# Patient Record
Sex: FEMALE | Race: WHITE | NOT HISPANIC OR LATINO | Employment: PART TIME | URBAN - METROPOLITAN AREA
[De-identification: names, ages, dates, MRNs, and addresses within clinical notes are randomized per-mention and may not be internally consistent; named-entity substitution may affect disease eponyms.]

---

## 2017-12-13 ENCOUNTER — OFFICE VISIT (OUTPATIENT)
Dept: INTERNAL MEDICINE | Facility: CLINIC | Age: 32
End: 2017-12-13

## 2017-12-13 VITALS
WEIGHT: 163 LBS | OXYGEN SATURATION: 99 % | DIASTOLIC BLOOD PRESSURE: 68 MMHG | HEART RATE: 75 BPM | SYSTOLIC BLOOD PRESSURE: 116 MMHG | BODY MASS INDEX: 24.71 KG/M2 | HEIGHT: 68 IN

## 2017-12-13 DIAGNOSIS — Z83.3 FAMILY HISTORY OF DIABETES MELLITUS IN FIRST DEGREE RELATIVE: Primary | ICD-10-CM

## 2017-12-13 DIAGNOSIS — E03.9 ACQUIRED HYPOTHYROIDISM: ICD-10-CM

## 2017-12-13 DIAGNOSIS — F32.A DEPRESSION, UNSPECIFIED DEPRESSION TYPE: ICD-10-CM

## 2017-12-13 DIAGNOSIS — Z86.2 HISTORY OF ANEMIA: ICD-10-CM

## 2017-12-13 LAB
ALBUMIN SERPL-MCNC: 4.7 G/DL (ref 3.2–4.8)
ALBUMIN/GLOB SERPL: 1.9 G/DL (ref 1.5–2.5)
ALP SERPL-CCNC: 49 U/L (ref 25–100)
ALT SERPL W P-5'-P-CCNC: 23 U/L (ref 7–40)
ANION GAP SERPL CALCULATED.3IONS-SCNC: 11 MMOL/L (ref 3–11)
AST SERPL-CCNC: 18 U/L (ref 0–33)
BASOPHILS # BLD AUTO: 0.02 10*3/MM3 (ref 0–0.2)
BASOPHILS NFR BLD AUTO: 0.3 % (ref 0–1)
BILIRUB SERPL-MCNC: 0.9 MG/DL (ref 0.3–1.2)
BUN BLD-MCNC: 8 MG/DL (ref 9–23)
BUN/CREAT SERPL: 11.4 (ref 7–25)
CALCIUM SPEC-SCNC: 9.4 MG/DL (ref 8.7–10.4)
CHLORIDE SERPL-SCNC: 100 MMOL/L (ref 99–109)
CO2 SERPL-SCNC: 26 MMOL/L (ref 20–31)
CREAT BLD-MCNC: 0.7 MG/DL (ref 0.6–1.3)
DEPRECATED RDW RBC AUTO: 40 FL (ref 37–54)
EOSINOPHIL # BLD AUTO: 0.09 10*3/MM3 (ref 0–0.3)
EOSINOPHIL NFR BLD AUTO: 1.2 % (ref 0–3)
ERYTHROCYTE [DISTWIDTH] IN BLOOD BY AUTOMATED COUNT: 11.9 % (ref 11.3–14.5)
GFR SERPL CREATININE-BSD FRML MDRD: 97 ML/MIN/1.73
GLOBULIN UR ELPH-MCNC: 2.5 GM/DL
GLUCOSE BLD-MCNC: 74 MG/DL (ref 70–100)
HBA1C MFR BLD: 5.1 % (ref 4.8–5.6)
HCT VFR BLD AUTO: 39.3 % (ref 34.5–44)
HGB BLD-MCNC: 13.3 G/DL (ref 11.5–15.5)
IMM GRANULOCYTES # BLD: 0.02 10*3/MM3 (ref 0–0.03)
IMM GRANULOCYTES NFR BLD: 0.3 % (ref 0–0.6)
LYMPHOCYTES # BLD AUTO: 1.81 10*3/MM3 (ref 0.6–4.8)
LYMPHOCYTES NFR BLD AUTO: 24.6 % (ref 24–44)
MCH RBC QN AUTO: 30.6 PG (ref 27–31)
MCHC RBC AUTO-ENTMCNC: 33.8 G/DL (ref 32–36)
MCV RBC AUTO: 90.6 FL (ref 80–99)
MONOCYTES # BLD AUTO: 0.42 10*3/MM3 (ref 0–1)
MONOCYTES NFR BLD AUTO: 5.7 % (ref 0–12)
NEUTROPHILS # BLD AUTO: 5 10*3/MM3 (ref 1.5–8.3)
NEUTROPHILS NFR BLD AUTO: 67.9 % (ref 41–71)
PLATELET # BLD AUTO: 271 10*3/MM3 (ref 150–450)
PMV BLD AUTO: 10.9 FL (ref 6–12)
POTASSIUM BLD-SCNC: 4.1 MMOL/L (ref 3.5–5.5)
PROT SERPL-MCNC: 7.2 G/DL (ref 5.7–8.2)
RBC # BLD AUTO: 4.34 10*6/MM3 (ref 3.89–5.14)
SODIUM BLD-SCNC: 137 MMOL/L (ref 132–146)
T4 FREE SERPL-MCNC: 1.04 NG/DL (ref 0.89–1.76)
TSH SERPL DL<=0.05 MIU/L-ACNC: 2.36 MIU/ML (ref 0.35–5.35)
WBC NRBC COR # BLD: 7.36 10*3/MM3 (ref 3.5–10.8)

## 2017-12-13 PROCEDURE — 83036 HEMOGLOBIN GLYCOSYLATED A1C: CPT | Performed by: NURSE PRACTITIONER

## 2017-12-13 PROCEDURE — 80053 COMPREHEN METABOLIC PANEL: CPT | Performed by: NURSE PRACTITIONER

## 2017-12-13 PROCEDURE — 84443 ASSAY THYROID STIM HORMONE: CPT | Performed by: NURSE PRACTITIONER

## 2017-12-13 PROCEDURE — 84439 ASSAY OF FREE THYROXINE: CPT | Performed by: NURSE PRACTITIONER

## 2017-12-13 PROCEDURE — 99203 OFFICE O/P NEW LOW 30 MIN: CPT | Performed by: NURSE PRACTITIONER

## 2017-12-13 PROCEDURE — 85025 COMPLETE CBC W/AUTO DIFF WBC: CPT | Performed by: NURSE PRACTITIONER

## 2017-12-13 NOTE — PROGRESS NOTES
No chief complaint on file.      HPI  Estefany Jesus is a 32 y.o. female  presents with complaint of 1 day history of sudden onset headache, fatigue, body aches, sore throat, ear pain, head congestion, sneezing, denies fever, cough, wheezing, shortness of breath, n/v/d    Has not taken anything other than       \No past medical history on file.    No family history on file.    Social History     Social History   • Marital status: Unknown     Spouse name: N/A   • Number of children: N/A   • Years of education: N/A     Occupational History   • Not on file.     Social History Main Topics   • Smoking status: Not on file   • Smokeless tobacco: Not on file   • Alcohol use Not on file   • Drug use: Not on file   • Sexual activity: Not on file     Other Topics Concern   • Not on file     Social History Narrative       Review of Systems    There were no vitals taken for this visit.    Physical Exam    Assessment/Plan  No results found for this or any previous visit.    There are no diagnoses linked to this encounter.        Patient verbalizes understanding of medication dosage, comfort measures, instructions for treatment and follow-up.    ANGELINA Mccarty  [unfilled]  5:28 PM

## 2017-12-13 NOTE — PROGRESS NOTES
Chief Complaint  Chief Complaint   Patient presents with   • Establish Care     needs new PCP. needs a physical. pt wants to get labs for thyroid.   • Fatigue   • Muscle Pain   • Hot Flashes       Estefany Jesus is a 32 y.o. female presents for hypothyroidism and is here to establish care.     HPI   She was diagnosed with hypothyroidism 4-5 yrs ago, she was on levothyroxine 100-150 mcg for approximately 1 year, but then lost her insurance, and stopped taking it when she ran out.  She has been unable to have her medication in 4 years.      She presents today with fatigue that has progressively worsened over the past few months; muscle tightness and pain; increased thirst; chills with face flushing; she denies rash on face or body, joint swelling.    She also has a history of depression which she states is no a chronic problem and she can usually manage this without medication.    She has not had a pap smear in 5-6 years and reports never having an abnormal pap.    Family history includes: Mother--pancreatic cancer; MGF--liver cancer; father--has melanoma with metastases to lungs/brain; PGM--cancer, unsure where originated.    Past Medical History:   Diagnosis Date   • Depression    • Hypothyroid    • Pancreatitis        History reviewed. No pertinent surgical history.    Family History   Problem Relation Age of Onset   • Diabetes Mother    • Hyperlipidemia Mother    • Diabetes Father    • Hyperlipidemia Father    • Hypertension Father    • COPD Maternal Grandmother    • COPD Maternal Grandfather    • COPD Paternal Grandmother    • COPD Paternal Grandfather        Social History     Social History   • Marital status: Unknown     Spouse name: N/A   • Number of children: N/A   • Years of education: N/A     Occupational History   • Not on file.     Social History Main Topics   • Smoking status: Current Every Day Smoker     Types: Electronic Cigarette   • Smokeless tobacco: Never Used   • Alcohol use Yes      Comment:  socially   • Drug use: No   • Sexual activity: Defer     Other Topics Concern   • Not on file     Social History Narrative   • No narrative on file     The following portions of the patient's history were reviewed and updated as appropriate: allergies, current medications, past family history, past medical history, past social history, past surgical history and problem list.    ROS  Review of Systems   Constitutional: Positive for appetite change, chills (with face flushing) and fatigue.   Respiratory: Negative for chest tightness and shortness of breath.    Cardiovascular: Negative for chest pain, palpitations and leg swelling.   Endocrine: Positive for cold intolerance, polydipsia and polyuria. Negative for polyphagia.   Genitourinary: Positive for frequency. Negative for difficulty urinating, dysuria, hematuria and urgency.   Musculoskeletal: Positive for myalgias.   Neurological: Negative for dizziness and headaches.   All other systems reviewed and are negative.      Vitals:    12/13/17 1736   BP: 116/68   Pulse: 75   SpO2: 99%       PHYSICAL EXAM   Physical Exam   Constitutional: She is oriented to person, place, and time. She appears well-developed and well-nourished.   HENT:   Head: Normocephalic and atraumatic.   Eyes: Conjunctivae are normal. Pupils are equal, round, and reactive to light.   Neck: Trachea normal and normal range of motion. Neck supple. No JVD present. No thyromegaly present.   Cardiovascular: Normal rate, regular rhythm, normal heart sounds and intact distal pulses.    No murmur heard.  No swelling in BLE   Pulmonary/Chest: Effort normal and breath sounds normal.   Lymphadenopathy:     She has no cervical adenopathy.        Right cervical: No posterior cervical adenopathy present.       Left cervical: No posterior cervical adenopathy present.        Right: No supraclavicular adenopathy present.        Left: No supraclavicular adenopathy present.   Neurological: She is alert and oriented to  person, place, and time. She has normal strength. No cranial nerve deficit.   CN 2-12 intact   Skin: Skin is warm, dry and intact.   Psychiatric: She has a normal mood and affect. Her speech is normal and behavior is normal.   Vitals reviewed.      No current outpatient prescriptions on file.      ASSESSMENT/PLAN    There are no preventive care reminders to display for this patient.    1. Family history of diabetes mellitus in first degree relative  - Comprehensive Metabolic Panel  - Hemoglobin A1c    2. Acquired hypothyroidism  - TSH  - T4, Free  -will rx levothyroxine according to result of TSH and FT4    3. History of anemia  - CBC w AUTO Differential  - CBC Auto Differential    4. Depression, unspecified depression type  -no medications prescribed at visit    F/U 6 weeks for annual physical with Pap and labs      Plan of care reviewed with patient at the conclusion of today's visit. Education was provided in regards to diagnosis, management and any prescribed or recommended OTC medications.  Patient verbalizes Understanding of and agreement with management plan.        Sarah Martins, ANGELINA  12/13/2017

## 2017-12-15 ENCOUNTER — TELEPHONE (OUTPATIENT)
Dept: INTERNAL MEDICINE | Facility: CLINIC | Age: 32
End: 2017-12-15

## 2017-12-18 ENCOUNTER — TELEPHONE (OUTPATIENT)
Dept: INTERNAL MEDICINE | Facility: CLINIC | Age: 32
End: 2017-12-18

## 2017-12-21 PROBLEM — F32.A DEPRESSION: Status: ACTIVE | Noted: 2017-12-21

## 2018-01-03 ENCOUNTER — TELEPHONE (OUTPATIENT)
Dept: INTERNAL MEDICINE | Facility: CLINIC | Age: 33
End: 2018-01-03

## 2018-01-03 DIAGNOSIS — R53.83 OTHER FATIGUE: Primary | ICD-10-CM

## 2018-01-03 DIAGNOSIS — M79.10 MYALGIA: ICD-10-CM

## 2018-01-03 DIAGNOSIS — E55.9 VITAMIN D DEFICIENCY: ICD-10-CM

## 2018-01-03 NOTE — TELEPHONE ENCOUNTER
Please let Ms. Jesus know that I have ordered additional blood tests to further evaluate her symptoms.  The most important information is to let her know I will be checking a Cortisol level, which must be drawn in the morning, preferably at 8 am.

## 2018-04-19 ENCOUNTER — OFFICE VISIT (OUTPATIENT)
Dept: INTERNAL MEDICINE | Facility: CLINIC | Age: 33
End: 2018-04-19

## 2018-04-19 VITALS
DIASTOLIC BLOOD PRESSURE: 80 MMHG | SYSTOLIC BLOOD PRESSURE: 126 MMHG | WEIGHT: 160 LBS | BODY MASS INDEX: 24.25 KG/M2 | HEIGHT: 68 IN | OXYGEN SATURATION: 99 % | HEART RATE: 74 BPM

## 2018-04-19 DIAGNOSIS — N94.6 DYSMENORRHEA: Primary | ICD-10-CM

## 2018-04-19 PROCEDURE — 99213 OFFICE O/P EST LOW 20 MIN: CPT | Performed by: NURSE PRACTITIONER

## 2018-04-19 RX ORDER — IBUPROFEN 800 MG/1
800 TABLET ORAL EVERY 6 HOURS PRN
Qty: 30 TABLET | Refills: 2 | Status: SHIPPED | OUTPATIENT
Start: 2018-04-19 | End: 2018-05-16 | Stop reason: SDUPTHER

## 2018-04-19 RX ORDER — MEFENAMIC ACID 250 MG/1
250 CAPSULE ORAL EVERY 6 HOURS PRN
Qty: 30 EACH | Refills: 0 | Status: SHIPPED | OUTPATIENT
Start: 2018-04-19 | End: 2019-03-18

## 2018-04-19 NOTE — PROGRESS NOTES
"CHIEF COMPLAINT  Chief Complaint   Patient presents with   • Dysmenorrhea     pt states that she is cramping so much it is causing her to vomit. also having some confusion.        HPI  Esteafny Jesus is a 32 y.o. female  presents with complaint of severe menstrual cramps on days 2-3; period lasts 6 days; day 1 is light; days 2-3 has heavy bleeding with clots, severe abdominal cramping with n/v, usually in bed on these days; days 4-6--fatigue, pain is manageable; wears super tampons--changes every 2 hours on heavy days; also has discomfort with intercourse; is missing a lot of work; wants GYN referral for evaluation      Past Medical History:   Diagnosis Date   • Depression    • Hypothyroid    • Pancreatitis        Family History   Problem Relation Age of Onset   • Diabetes Mother    • Hyperlipidemia Mother    • Diabetes Father    • Hyperlipidemia Father    • Hypertension Father    • COPD Maternal Grandmother    • COPD Maternal Grandfather    • COPD Paternal Grandmother    • COPD Paternal Grandfather        Social History     Social History   • Marital status: Unknown     Spouse name: N/A   • Number of children: N/A   • Years of education: N/A     Occupational History   • Not on file.     Social History Main Topics   • Smoking status: Current Every Day Smoker     Types: Electronic Cigarette   • Smokeless tobacco: Never Used   • Alcohol use Yes      Comment: socially   • Drug use: No   • Sexual activity: Defer     Other Topics Concern   • Not on file     Social History Narrative   • No narrative on file       ROS  Review of Systems   Constitutional: Positive for activity change, appetite change and fatigue.   Gastrointestinal: Positive for nausea and vomiting.   Genitourinary: Positive for dyspareunia and menstrual problem.   All other systems reviewed and are negative.      /80   Pulse 74   Ht 172.7 cm (68\")   Wt 72.6 kg (160 lb)   SpO2 99%   BMI 24.33 kg/m²     PHYSICAL EXAM  Physical Exam "   Constitutional: She is oriented to person, place, and time. She appears well-developed and well-nourished.   HENT:   Head: Normocephalic and atraumatic.   Eyes: Conjunctivae are normal.   Neck: Normal range of motion. Neck supple.   Abdominal: Soft. Normal appearance and bowel sounds are normal. She exhibits no distension. There is no hepatosplenomegaly. There is tenderness (severe tenderness) in the right lower quadrant, suprapubic area and left lower quadrant. There is no rigidity, no rebound, no guarding, no CVA tenderness, no tenderness at McBurney's point and negative Carrera's sign. No hernia.   Neurological: She is alert and oriented to person, place, and time.   Skin: Skin is warm, dry and intact.   Vitals reviewed.      ASSESSMENT/PLAN  1. Dysmenorrhea  --if insurance covers use mefenamic acid  - Mefenamic Acid 250 MG capsule; Take 250 mg by mouth Every 6 (Six) Hours As Needed (pain).  Dispense: 30 each; Refill: 0  -if insurance does NOT cover mefenamic acid--use ibuprofen  - ibuprofen (ADVIL,MOTRIN) 800 MG tablet; Take 1 tablet by mouth Every 6 (Six) Hours As Needed for Mild Pain .  Dispense: 30 tablet; Refill: 2  - Ambulatory Referral to Gynecology--for evaluation      FOLLOW-UP  1 month(s) with me for annual physical with fasting labs    RTC sooner as needed.    Patient verbalizes understanding of medication dosage, comfort measures, instructions for treatment and follow-up.    Sarah Martins, ANGELINA  04/19/2018

## 2018-05-07 ENCOUNTER — OFFICE VISIT (OUTPATIENT)
Dept: INTERNAL MEDICINE | Facility: CLINIC | Age: 33
End: 2018-05-07

## 2018-05-07 VITALS
SYSTOLIC BLOOD PRESSURE: 128 MMHG | WEIGHT: 150 LBS | OXYGEN SATURATION: 98 % | BODY MASS INDEX: 22.73 KG/M2 | DIASTOLIC BLOOD PRESSURE: 82 MMHG | HEIGHT: 68 IN | HEART RATE: 73 BPM

## 2018-05-07 DIAGNOSIS — R11.0 NAUSEA: ICD-10-CM

## 2018-05-07 DIAGNOSIS — E03.9 ACQUIRED HYPOTHYROIDISM: ICD-10-CM

## 2018-05-07 DIAGNOSIS — Z83.3 FAMILY HISTORY OF DIABETES MELLITUS IN FIRST DEGREE RELATIVE: ICD-10-CM

## 2018-05-07 DIAGNOSIS — F17.290 NICOTINE DEPENDENCE, OTHER TOBACCO PRODUCT, UNCOMPLICATED: ICD-10-CM

## 2018-05-07 DIAGNOSIS — Z86.2 HISTORY OF ANEMIA: ICD-10-CM

## 2018-05-07 DIAGNOSIS — E55.9 VITAMIN D DEFICIENCY: ICD-10-CM

## 2018-05-07 DIAGNOSIS — Z00.00 HEALTHCARE MAINTENANCE: ICD-10-CM

## 2018-05-07 DIAGNOSIS — R53.83 OTHER FATIGUE: ICD-10-CM

## 2018-05-07 DIAGNOSIS — Z72.51 HIGH RISK SEXUAL BEHAVIOR: ICD-10-CM

## 2018-05-07 DIAGNOSIS — N94.6 DYSMENORRHEA: Primary | ICD-10-CM

## 2018-05-07 LAB
25(OH)D3 SERPL-MCNC: 15 NG/ML
ALBUMIN SERPL-MCNC: 4.6 G/DL (ref 3.2–4.8)
ALBUMIN/GLOB SERPL: 1.8 G/DL (ref 1.5–2.5)
ALP SERPL-CCNC: 52 U/L (ref 25–100)
ALT SERPL W P-5'-P-CCNC: 24 U/L (ref 7–40)
ANION GAP SERPL CALCULATED.3IONS-SCNC: 8 MMOL/L (ref 3–11)
ARTICHOKE IGE QN: 67 MG/DL (ref 0–130)
AST SERPL-CCNC: 18 U/L (ref 0–33)
BASOPHILS # BLD AUTO: 0.03 10*3/MM3 (ref 0–0.2)
BASOPHILS NFR BLD AUTO: 0.4 % (ref 0–1)
BILIRUB SERPL-MCNC: 1 MG/DL (ref 0.3–1.2)
BUN BLD-MCNC: 9 MG/DL (ref 9–23)
BUN/CREAT SERPL: 12.9 (ref 7–25)
CALCIUM SPEC-SCNC: 9.2 MG/DL (ref 8.7–10.4)
CHLORIDE SERPL-SCNC: 101 MMOL/L (ref 99–109)
CHOLEST SERPL-MCNC: 165 MG/DL (ref 0–200)
CO2 SERPL-SCNC: 26 MMOL/L (ref 20–31)
CREAT BLD-MCNC: 0.7 MG/DL (ref 0.6–1.3)
DEPRECATED RDW RBC AUTO: 40.8 FL (ref 37–54)
EOSINOPHIL # BLD AUTO: 0.03 10*3/MM3 (ref 0–0.3)
EOSINOPHIL NFR BLD AUTO: 0.4 % (ref 0–3)
ERYTHROCYTE [DISTWIDTH] IN BLOOD BY AUTOMATED COUNT: 12.7 % (ref 11.3–14.5)
GFR SERPL CREATININE-BSD FRML MDRD: 97 ML/MIN/1.73
GLOBULIN UR ELPH-MCNC: 2.5 GM/DL
GLUCOSE BLD-MCNC: 92 MG/DL (ref 70–100)
HAV IGM SERPL QL IA: NORMAL
HBV CORE IGM SERPL QL IA: NORMAL
HBV SURFACE AG SERPL QL IA: NORMAL
HCT VFR BLD AUTO: 40.8 % (ref 34.5–44)
HCV AB SER DONR QL: NORMAL
HDLC SERPL-MCNC: 83 MG/DL (ref 40–60)
HGB BLD-MCNC: 14.1 G/DL (ref 11.5–15.5)
HIV1+2 AB SER QL: NORMAL
IMM GRANULOCYTES # BLD: 0.02 10*3/MM3 (ref 0–0.03)
IMM GRANULOCYTES NFR BLD: 0.3 % (ref 0–0.6)
LYMPHOCYTES # BLD AUTO: 1.16 10*3/MM3 (ref 0.6–4.8)
LYMPHOCYTES NFR BLD AUTO: 16.5 % (ref 24–44)
MCH RBC QN AUTO: 30.9 PG (ref 27–31)
MCHC RBC AUTO-ENTMCNC: 34.6 G/DL (ref 32–36)
MCV RBC AUTO: 89.5 FL (ref 80–99)
MONOCYTES # BLD AUTO: 0.5 10*3/MM3 (ref 0–1)
MONOCYTES NFR BLD AUTO: 7.1 % (ref 0–12)
NEUTROPHILS # BLD AUTO: 5.31 10*3/MM3 (ref 1.5–8.3)
NEUTROPHILS NFR BLD AUTO: 75.6 % (ref 41–71)
PLATELET # BLD AUTO: 282 10*3/MM3 (ref 150–450)
PMV BLD AUTO: 11.2 FL (ref 6–12)
POTASSIUM BLD-SCNC: 4.2 MMOL/L (ref 3.5–5.5)
PROT SERPL-MCNC: 7.1 G/DL (ref 5.7–8.2)
RBC # BLD AUTO: 4.56 10*6/MM3 (ref 3.89–5.14)
SODIUM BLD-SCNC: 135 MMOL/L (ref 132–146)
TRIGL SERPL-MCNC: 66 MG/DL (ref 0–150)
TSH SERPL DL<=0.05 MIU/L-ACNC: 2.94 MIU/ML (ref 0.35–5.35)
VIT B12 BLD-MCNC: 331 PG/ML (ref 211–911)
WBC NRBC COR # BLD: 7.03 10*3/MM3 (ref 3.5–10.8)

## 2018-05-07 PROCEDURE — G0432 EIA HIV-1/HIV-2 SCREEN: HCPCS | Performed by: NURSE PRACTITIONER

## 2018-05-07 PROCEDURE — 87481 CANDIDA DNA AMP PROBE: CPT | Performed by: NURSE PRACTITIONER

## 2018-05-07 PROCEDURE — 86695 HERPES SIMPLEX TYPE 1 TEST: CPT | Performed by: NURSE PRACTITIONER

## 2018-05-07 PROCEDURE — 82306 VITAMIN D 25 HYDROXY: CPT | Performed by: NURSE PRACTITIONER

## 2018-05-07 PROCEDURE — 87591 N.GONORRHOEAE DNA AMP PROB: CPT | Performed by: NURSE PRACTITIONER

## 2018-05-07 PROCEDURE — 87491 CHLMYD TRACH DNA AMP PROBE: CPT | Performed by: NURSE PRACTITIONER

## 2018-05-07 PROCEDURE — 84443 ASSAY THYROID STIM HORMONE: CPT | Performed by: NURSE PRACTITIONER

## 2018-05-07 PROCEDURE — 85025 COMPLETE CBC W/AUTO DIFF WBC: CPT | Performed by: NURSE PRACTITIONER

## 2018-05-07 PROCEDURE — 99395 PREV VISIT EST AGE 18-39: CPT | Performed by: NURSE PRACTITIONER

## 2018-05-07 PROCEDURE — 80061 LIPID PANEL: CPT | Performed by: NURSE PRACTITIONER

## 2018-05-07 PROCEDURE — 87798 DETECT AGENT NOS DNA AMP: CPT | Performed by: NURSE PRACTITIONER

## 2018-05-07 PROCEDURE — 80074 ACUTE HEPATITIS PANEL: CPT | Performed by: NURSE PRACTITIONER

## 2018-05-07 PROCEDURE — 86696 HERPES SIMPLEX TYPE 2 TEST: CPT | Performed by: NURSE PRACTITIONER

## 2018-05-07 PROCEDURE — 82607 VITAMIN B-12: CPT | Performed by: NURSE PRACTITIONER

## 2018-05-07 PROCEDURE — 80053 COMPREHEN METABOLIC PANEL: CPT | Performed by: NURSE PRACTITIONER

## 2018-05-07 PROCEDURE — 87661 TRICHOMONAS VAGINALIS AMPLIF: CPT | Performed by: NURSE PRACTITIONER

## 2018-05-07 RX ORDER — ONDANSETRON 4 MG/1
4 TABLET, ORALLY DISINTEGRATING ORAL EVERY 8 HOURS PRN
Qty: 20 TABLET | Refills: 2 | Status: SHIPPED | OUTPATIENT
Start: 2018-05-07 | End: 2018-11-05 | Stop reason: SDUPTHER

## 2018-05-07 NOTE — PATIENT INSTRUCTIONS
IF YOU SMOKE OR USE TOBACCO PLEASE READ THE FOLLOWING:    Why is smoking bad for me?  Smoking increases the risk of heart disease, lung disease, vascular disease, stroke, and cancer.     If you smoke, STOP!    If you would like more information on quitting smoking, please visit the [employer] website: [email or web address]   This link will provide additional resources including the QUIT line and the Beat the Pack support groups.     For more information:    Quit Now [facility]  1-800-QUIT-NOW  https://[place].quitlogix.org/en-[place]/

## 2018-05-07 NOTE — PROGRESS NOTES
Chief Complaint  Chief Complaint   Patient presents with   • Annual Exam     w/ pap. New gyn referral for female doctor        HPI   Estefany Jesus is a 32 y.o. female who presents today for her annual physical exam.    Dysmenorrhea  She has had an increase in her discharge in the past few months; its is clear and w/o odor, but not normal for her; she is also worried about STD infection because she had unprotected sex approx 5 yrs ago and would like to be tested for HIV, HSV, Hep B/C, gonorrhea, chlamydia    Very painful periods over past year, as discussed in her last visit--day 1 is pretty normal; day 2-3 are extremely heavy bleeding with clots, severe cramping, n/v, changing super plus tampons every 2 hours--usually is confined to bed on these days; days 4-6 c/o fatigue, cramping is manageable; she also c/o painful intercourse.    She was diagnosed with HPV in early 20's but never went back for further evaluation or treatment.    Hypothyroidism  Last TSH 2.360; not currently taking levothyroxine or other thyroid medication; no c/o sx    History of anemia  She thinks it was iron deficiency, but does not remember exactly; she does c/o severe fatigue; and heavier bleeding during periods    Family hx--  mother- passed away d/t pancreatic cancer   father-- passed away d/t metastatic melanoma   MGF--passed away d/t liver cancer     Denies family hx of breast ca, colon ca, uterine, cervical, ovarian.      Past Medical History:   Diagnosis Date   • Depression    • Hypothyroid    • Pancreatitis      No past surgical history on file.  Family History   Problem Relation Age of Onset   • Diabetes Mother    • Hyperlipidemia Mother    • Diabetes Father    • Hyperlipidemia Father    • Hypertension Father    • COPD Maternal Grandmother    • COPD Maternal Grandfather    • COPD Paternal Grandmother    • COPD Paternal Grandfather      Social History     Social History   • Marital status: Unknown     Spouse name: N/A   • Number of  children: N/A   • Years of education: N/A     Occupational History   • Not on file.     Social History Main Topics   • Smoking status: Current Every Day Smoker     Types: Electronic Cigarette   • Smokeless tobacco: Never Used      Comment: declines tobacco/smoking cessation at this time   • Alcohol use Yes      Comment: socially   • Drug use: No   • Sexual activity: Yes     Partners: Male     Birth control/ protection: None     Other Topics Concern   • Not on file     Social History Narrative   • No narrative on file     Obstetric History:  OB History     No data available         Menstrual History:  Menarche age: 12 years  Patient's last menstrual period was 04/18/2018.  Period Cycle (Days): 30  Period Duration (Days): 5-6   Period Pattern: Regular  Menstrual Flow: Heavy (heavy flow with mult clots on days 2-3)  Menstrual Control: Tampon  Menstrual Control Change Freq (Hours): day 2-3--changes every 2 hrs  Dysmenorrhea: (!) Severe  Dysmenorrhea Symptoms: Cramping, Nausea, Other (Comment) (vomiting; sharp pains)  Cyclic Symptoms: (!) Yes  Cyclical Symptoms: Irritability, Moodiness, Breast tenderness    Sexual History:   Age of First Sexual Encounter: 17 years  Sexually Transmitted Infection History: Chlamydia      History of abnormal Pap smear: yes - 10 yrs ago, dx'd with HPV; last performed 8 yrs ago  Perform regular self breast exam: no  History of abnormal mammogram: N/A; last performed N/A  History of Osteoporosis: no; last DEXA scan N/A  History of abnormal lipids: No  History of Vitamin B-12 deficiency : yes - in past--took injections for a while  History of D deficiency: no    Family history of uterine or ovarian cancer: no  Family History of colon cancer/colon polyps: no    Family history of breast cancer: no    Received Gardasil immunization: no  Current contraception: none  History of STD: yes - HPV; chlamydia        The following portions of the patient's history were reviewed and updated as appropriate:  "allergies, current medications, past family history, past medical history, past social history, past surgical history and problem list.    ROS  Visit Vitals  /82   Pulse 73   Ht 172.7 cm (68\")   Wt 68 kg (150 lb)   LMP 04/18/2018   SpO2 98%   BMI 22.81 kg/m²       Review of Systems   Constitutional: Positive for fatigue. Negative for activity change and appetite change.   Respiratory: Negative for chest tightness and shortness of breath.    Cardiovascular: Negative for chest pain, palpitations and leg swelling.   Gastrointestinal: Positive for nausea and vomiting. Negative for abdominal distention, abdominal pain, constipation and diarrhea.   Genitourinary: Positive for menstrual problem. Negative for pelvic pain.   Skin: Negative for rash.   Neurological: Negative for dizziness and headaches.   All other systems reviewed and are negative.        PHYSICAL EXAM   Physical Exam   Constitutional: She is oriented to person, place, and time. She appears well-developed and well-nourished. She is cooperative.   HENT:   Head: Normocephalic and atraumatic.   Right Ear: Hearing, tympanic membrane, external ear and ear canal normal.   Left Ear: Hearing, tympanic membrane, external ear and ear canal normal.   Nose: Nose normal.   Mouth/Throat: Uvula is midline and oropharynx is clear and moist.   Eyes: Conjunctivae, EOM and lids are normal. Pupils are equal, round, and reactive to light.   Neck: Trachea normal and normal range of motion. Neck supple. No JVD present. No thyromegaly present.   Cardiovascular: Normal rate, regular rhythm, normal heart sounds and intact distal pulses.    No murmur heard.  No swelling in BLE   Pulmonary/Chest: Effort normal and breath sounds normal.   Abdominal: Soft. Normal appearance and bowel sounds are normal. There is no hepatosplenomegaly. There is no tenderness. No hernia. Hernia confirmed negative in the right inguinal area and confirmed negative in the left inguinal area. "   Genitourinary: Rectum normal and uterus normal.       Pelvic exam was performed with patient supine. There is no rash, tenderness or lesion on the right labia. There is no rash, tenderness or lesion on the left labia. Cervix exhibits discharge. Cervix exhibits no motion tenderness and no friability. Right adnexum displays no mass, no tenderness and no fullness. Left adnexum displays no mass, no tenderness and no fullness. No erythema, tenderness or bleeding in the vagina. Vaginal discharge found.   Lymphadenopathy:     She has no cervical adenopathy.        Right cervical: No posterior cervical adenopathy present.       Left cervical: No posterior cervical adenopathy present.     She has no axillary adenopathy.        Right: No inguinal and no supraclavicular adenopathy present.        Left: No inguinal and no supraclavicular adenopathy present.   Neurological: She is alert and oriented to person, place, and time. She has normal strength. No cranial nerve deficit or sensory deficit. She displays a negative Romberg sign.   Reflex Scores:       Tricep reflexes are 2+ on the right side and 2+ on the left side.       Bicep reflexes are 2+ on the right side and 2+ on the left side.       Brachioradialis reflexes are 2+ on the right side and 2+ on the left side.       Patellar reflexes are 2+ on the right side and 2+ on the left side.       Achilles reflexes are 2+ on the right side and 2+ on the left side.  Skin: Skin is warm, dry and intact. No rash noted. No cyanosis. Nails show no clubbing.   Psychiatric: She has a normal mood and affect. Her speech is normal and behavior is normal. Thought content normal.   Vitals reviewed.          Current Outpatient Prescriptions:   •  ibuprofen (ADVIL,MOTRIN) 800 MG tablet, Take 1 tablet by mouth Every 6 (Six) Hours As Needed for Mild Pain ., Disp: 30 tablet, Rfl: 2  •  Mefenamic Acid 250 MG capsule, Take 250 mg by mouth Every 6 (Six) Hours As Needed (pain)., Disp: 30 each, Rfl:  0  •  ondansetron ODT (ZOFRAN-ODT) 4 MG disintegrating tablet, Take 1 tablet by mouth Every 8 (Eight) Hours As Needed for Nausea or Vomiting., Disp: 20 tablet, Rfl: 2  •  vitamin D (ERGOCALCIFEROL) 87920 units capsule capsule, Take 1 capsule by mouth 1 (One) Time Per Week for 12 doses. (12 weeks total), Disp: 12 capsule, Rfl: 0      ASSESSMENT/PLAN  There are no preventive care reminders to display for this patient.      1. Dysmenorrhea  -start mefanamic acid 250 mg q 6 hrs (Ponstel) or ibuprofen 800 mg q 6 hrs for menstrual cramping  - CBC & Differential  - CBC Auto Differential    2. Nausea  - ondansetron ODT (ZOFRAN-ODT) 4 MG disintegrating tablet; Take 1 tablet by mouth Every 8 (Eight) Hours As Needed for Nausea or Vomiting.  Dispense: 20 tablet; Refill: 2    3. Nicotine dependence, other tobacco product, uncomplicated  -discussed option for smoking cessation counseling--declines at this time    4. Acquired hypothyroidism  -no medication  - TSH    5. Family history of diabetes mellitus in first degree relative  - Comprehensive metabolic panel    6. History of anemia  -monitor for iron deficiency, B12, or other anemias--correct appropriately  - CBC & Differential  - CBC Auto Differential    7. Other fatigue  -correct appropriately if needed  - Vitamin B12    8. Vitamin D deficiency  -will rx high dose vit d or OTC supplement accordingly  - Vitamin D 25 Hydroxy    9. Healthcare maintenance  -routine cholesterol check annually  - Lipid Panel    10. High risk sexual behavior  -will notify and refer if appropriate when results are back  - HIV-1 / O / 2 Ag / Antibody 4th Generation  - HSV 1 & 2 - Specific Antibody, IgG  - Hepatitis Panel, Acute  - NuSwab VG+ - Swab, Vagina      Plan of care reviewed with patient at the conclusion of today's visit. Education was provided in regards to diagnosis, diet and exercise, cervical cancer screening, breast cancer screening and the importance of yearly mammograms. Management and  any prescribed or recommended OTC medications.  Patient verbalizes understanding of and agreement with management plan.    FOLLOW-UP  6 month(s) for recheck    May RTC sooner as needed.      Sarah Martins, APRN  05/07/2018

## 2018-05-08 LAB
HSV1 IGG SER IA-ACNC: <0.91 INDEX (ref 0–0.9)
HSV2 IGG SER IA-ACNC: <0.91 INDEX (ref 0–0.9)

## 2018-05-09 ENCOUNTER — TELEPHONE (OUTPATIENT)
Dept: INTERNAL MEDICINE | Facility: CLINIC | Age: 33
End: 2018-05-09

## 2018-05-09 DIAGNOSIS — E55.9 VITAMIN D DEFICIENCY: ICD-10-CM

## 2018-05-09 DIAGNOSIS — E55.9 VITAMIN D DEFICIENCY: Primary | ICD-10-CM

## 2018-05-09 DIAGNOSIS — N94.6 DYSMENORRHEA: ICD-10-CM

## 2018-05-09 RX ORDER — ERGOCALCIFEROL 1.25 MG/1
50000 CAPSULE ORAL WEEKLY
Qty: 12 CAPSULE | Refills: 0 | Status: SHIPPED | OUTPATIENT
Start: 2018-05-09 | End: 2018-05-16 | Stop reason: SDUPTHER

## 2018-05-09 NOTE — TELEPHONE ENCOUNTER
----- Message from ANGELINA Hawk sent at 5/9/2018  8:26 AM EDT -----  Please notify Ms. Jesus that tests for herpes 1 & 2; hepatitis A, B, & C; HIV--are all negative  1. Kidney/liver, sodium, potassium are all WNL  2. Cholesterol numbers are good as well  3. Vitamin d is very low at 15.0 (nml range )--probable cause of fatigue--will send high dose vit d to her phar--50,000 units, 1 cap once a week x 12 wks, then OTC D3 2000 units daily  4. Vit B12 and thyroid are normal and blood count is WNL--no anemia or infection

## 2018-05-11 LAB
A VAGINAE DNA VAG QL NAA+PROBE: NORMAL SCORE
BVAB2 DNA VAG QL NAA+PROBE: NORMAL SCORE
C ALBICANS DNA VAG QL NAA+PROBE: NEGATIVE
C GLABRATA DNA VAG QL NAA+PROBE: NEGATIVE
C TRACH RRNA SPEC DONR QL NAA+PROBE: NEGATIVE
MEGASPHAERA 1: NORMAL SCORE
N GONORRHOEA DNA SPEC QL NAA+PROBE: NEGATIVE
T VAGINALIS RRNA GENITAL QL PROBE: NEGATIVE

## 2018-05-15 ENCOUNTER — TELEPHONE (OUTPATIENT)
Dept: INTERNAL MEDICINE | Facility: CLINIC | Age: 33
End: 2018-05-15

## 2018-05-15 NOTE — TELEPHONE ENCOUNTER
PATIENT SUDHA SCHROEDER A RETURN CALL WITH RECENT LAB RESULTS, OK TO LEAVE .    CALL BACK [phone]

## 2018-05-16 RX ORDER — IBUPROFEN 800 MG/1
800 TABLET ORAL EVERY 6 HOURS PRN
Qty: 30 TABLET | Refills: 2 | Status: SHIPPED | OUTPATIENT
Start: 2018-05-16 | End: 2019-03-18

## 2018-05-16 RX ORDER — ERGOCALCIFEROL 1.25 MG/1
50000 CAPSULE ORAL WEEKLY
Qty: 12 CAPSULE | Refills: 0 | Status: SHIPPED | OUTPATIENT
Start: 2018-05-16 | End: 2018-08-02

## 2018-05-18 ENCOUNTER — TELEPHONE (OUTPATIENT)
Dept: INTERNAL MEDICINE | Facility: CLINIC | Age: 33
End: 2018-05-18

## 2018-05-18 NOTE — TELEPHONE ENCOUNTER
Called pt back. Did not go into many details on voicemail, but left a message stating to repeat pap in 1 year. Gave office # for any questions.

## 2018-05-18 NOTE — TELEPHONE ENCOUNTER
PATIENT STATES SHE RECEIVED A CALL FROM OUR OFFICE (NO VOICEMAIL WAS LEFT) AND WOULD LIKE TO GET A CALL BACK (VOICEMAIL DUE TO HER BEING AT WORK ALL DAY TODAY). THE PATIENT CAN BE REACHED [phone]

## 2018-11-05 ENCOUNTER — OFFICE VISIT (OUTPATIENT)
Dept: INTERNAL MEDICINE | Facility: CLINIC | Age: 33
End: 2018-11-05

## 2018-11-05 VITALS
OXYGEN SATURATION: 99 % | SYSTOLIC BLOOD PRESSURE: 110 MMHG | HEART RATE: 72 BPM | HEIGHT: 68 IN | DIASTOLIC BLOOD PRESSURE: 74 MMHG

## 2018-11-05 DIAGNOSIS — N94.6 DYSMENORRHEA: Primary | ICD-10-CM

## 2018-11-05 DIAGNOSIS — R11.0 NAUSEA: ICD-10-CM

## 2018-11-05 DIAGNOSIS — Z30.011 ENCOUNTER FOR INITIAL PRESCRIPTION OF CONTRACEPTIVE PILLS: ICD-10-CM

## 2018-11-05 PROCEDURE — 99214 OFFICE O/P EST MOD 30 MIN: CPT | Performed by: NURSE PRACTITIONER

## 2018-11-05 RX ORDER — ONDANSETRON 4 MG/1
4 TABLET, ORALLY DISINTEGRATING ORAL EVERY 8 HOURS PRN
Qty: 20 TABLET | Refills: 2 | Status: SHIPPED | OUTPATIENT
Start: 2018-11-05 | End: 2019-11-17

## 2018-11-05 NOTE — PROGRESS NOTES
CHIEF COMPLAINT  Contraception (discuss options )      HPI  Estefany Jesus is a 33 y.o. female is here today for follow-up and to discuss birth control options    Still having severely painful periods lasting 5 days, every 24-30+ days cycle; heavy bleeding x 3 days with occ clots; severe pain x 2-3 days--often has to call in to work because of the pain; occ nausea w/cramping; had previously tried xulane patch in high school, but did not like it because of stickiness left on skin after removing patch; would like to take daily OCP; she also recalls taking Loestrin in the past; last PAP showed atypical squamous cells (undetermined significance)--HPV testing was negative--repeat in 1 year; neg for hx of DVT and/or PE      ROS  Pertinent negatives include h/a, dizziness, change in vision, SOA, chest pain, cough, edema, wheezing, difficulty with speech, weakness, constipation, diarrhea, blood in stool or urine, difficulty with urination, frequency, flank pain    Pertinent ROS noted in HPI      Past Medical History:   Diagnosis Date   • Depression    • Hypothyroid    • Pancreatitis        History reviewed. No pertinent surgical history.    Family History   Problem Relation Age of Onset   • Diabetes Mother    • Hyperlipidemia Mother    • Diabetes Father    • Hyperlipidemia Father    • Hypertension Father    • COPD Maternal Grandmother    • COPD Maternal Grandfather    • COPD Paternal Grandmother    • COPD Paternal Grandfather        Social History     Socioeconomic History   • Marital status: Unknown     Spouse name: Not on file   • Number of children: Not on file   • Years of education: Not on file   • Highest education level: Not on file   Social Needs   • Financial resource strain: Not on file   • Food insecurity - worry: Not on file   • Food insecurity - inability: Not on file   • Transportation needs - medical: Not on file   • Transportation needs - non-medical: Not on file   Occupational History   • Not on file  "  Tobacco Use   • Smoking status: Current Every Day Smoker     Types: Electronic Cigarette   • Smokeless tobacco: Never Used   • Tobacco comment: declines tobacco/smoking cessation at this time   Substance and Sexual Activity   • Alcohol use: Yes     Comment: socially   • Drug use: No   • Sexual activity: Yes     Partners: Male     Birth control/protection: None   Other Topics Concern   • Not on file   Social History Narrative   • Not on file       The following portions of the patient's history were reviewed and updated as appropriate: allergies, current medications, past family history, past medical history, past social history, past surgical history and problem list.      /74   Pulse 72   Ht 172.7 cm (68\")   SpO2 99%   BMI 22.81 kg/m²     PHYSICAL EXAM  Physical Exam   Constitutional: She is oriented to person, place, and time. She appears well-developed and well-nourished.   HENT:   Head: Normocephalic and atraumatic.   Abdominal:   Abd is soft, mild TTP of bilat lower quadrants; neg for guarding, rebound   Neurological: She is alert and oriented to person, place, and time.   Skin: Skin is warm, dry and intact.   Vitals reviewed.      Lab Results   Component Value Date    HGBA1C 5.10 12/13/2017       Lab Results   Component Value Date    TSH 2.941 05/07/2018       Lab Results   Component Value Date    CHOL 165 05/07/2018    TRIG 66 05/07/2018    HDL 83 (H) 05/07/2018    LDL 67 05/07/2018       Lab Results   Component Value Date    CREATININE 0.70 05/07/2018    BUN 9 05/07/2018     05/07/2018    K 4.2 05/07/2018     05/07/2018    CO2 26.0 05/07/2018         Results for orders placed or performed in visit on 05/07/18   NuSwab VG+ - Swab, Vagina   Result Value Ref Range    Atopobium Vaginae Low - 0 Score    BVAB 2 Low - 0 Score    Megasphaera 1 Low - 0 Score    Candida Albicans, ELPIDIO Negative Negative    Candida Glabrata, ELPIDIO Negative Negative    Trichomonas vaginosis Negative Negative    " Chlamydia trachomatis, ELPIDIO Negative Negative    Neisseria gonorrhoeae, ELPIDIO Negative Negative   Comprehensive metabolic panel   Result Value Ref Range    Glucose 92 70 - 100 mg/dL    BUN 9 9 - 23 mg/dL    Creatinine 0.70 0.60 - 1.30 mg/dL    Sodium 135 132 - 146 mmol/L    Potassium 4.2 3.5 - 5.5 mmol/L    Chloride 101 99 - 109 mmol/L    CO2 26.0 20.0 - 31.0 mmol/L    Calcium 9.2 8.7 - 10.4 mg/dL    Total Protein 7.1 5.7 - 8.2 g/dL    Albumin 4.60 3.20 - 4.80 g/dL    ALT (SGPT) 24 7 - 40 U/L    AST (SGOT) 18 0 - 33 U/L    Alkaline Phosphatase 52 25 - 100 U/L    Total Bilirubin 1.0 0.3 - 1.2 mg/dL    eGFR Non African Amer 97 >60 mL/min/1.73    Globulin 2.5 gm/dL    A/G Ratio 1.8 1.5 - 2.5 g/dL    BUN/Creatinine Ratio 12.9 7.0 - 25.0    Anion Gap 8.0 3.0 - 11.0 mmol/L   Lipid Panel   Result Value Ref Range    Total Cholesterol 165 0 - 200 mg/dL    Triglycerides 66 0 - 150 mg/dL    HDL Cholesterol 83 (H) 40 - 60 mg/dL    LDL Cholesterol  67 0 - 130 mg/dL   Vitamin D 25 Hydroxy   Result Value Ref Range    25 Hydroxy, Vitamin D 15.0 ng/ml   Vitamin B12   Result Value Ref Range    Vitamin B-12 331 211 - 911 pg/mL   TSH   Result Value Ref Range    TSH 2.941 0.350 - 5.350 mIU/mL   HIV-1 / O / 2 Ag / Antibody 4th Generation   Result Value Ref Range    HIV-1/ HIV-2 Non-Reactive Non-Reactive   HSV 1 & 2 - Specific Antibody, IgG   Result Value Ref Range    HSV 1 IgG, Type Specific <0.91 0.00 - 0.90 index    HSV 2 IgG <0.91 0.00 - 0.90 index   Hepatitis Panel, Acute   Result Value Ref Range    Hepatitis B Surface Ag Non-Reactive Non-Reactive    Hep A IgM Non-Reactive Non-Reactive    Hep B C IgM Non-Reactive Non-Reactive    Hepatitis C Ab Non-Reactive Non-Reactive   CBC Auto Differential   Result Value Ref Range    WBC 7.03 3.50 - 10.80 10*3/mm3    RBC 4.56 3.89 - 5.14 10*6/mm3    Hemoglobin 14.1 11.5 - 15.5 g/dL    Hematocrit 40.8 34.5 - 44.0 %    MCV 89.5 80.0 - 99.0 fL    MCH 30.9 27.0 - 31.0 pg    MCHC 34.6 32.0 - 36.0 g/dL     RDW 12.7 11.3 - 14.5 %    RDW-SD 40.8 37.0 - 54.0 fl    MPV 11.2 6.0 - 12.0 fL    Platelets 282 150 - 450 10*3/mm3    Neutrophil % 75.6 (H) 41.0 - 71.0 %    Lymphocyte % 16.5 (L) 24.0 - 44.0 %    Monocyte % 7.1 0.0 - 12.0 %    Eosinophil % 0.4 0.0 - 3.0 %    Basophil % 0.4 0.0 - 1.0 %    Immature Grans % 0.3 0.0 - 0.6 %    Neutrophils, Absolute 5.31 1.50 - 8.30 10*3/mm3    Lymphocytes, Absolute 1.16 0.60 - 4.80 10*3/mm3    Monocytes, Absolute 0.50 0.00 - 1.00 10*3/mm3    Eosinophils, Absolute 0.03 0.00 - 0.30 10*3/mm3    Basophils, Absolute 0.03 0.00 - 0.20 10*3/mm3    Immature Grans, Absolute 0.02 0.00 - 0.03 10*3/mm3       ASSESSMENT/PLAN  1. Dysmenorrhea  -mefenamic acid 250 mg q 6 hrs prn menstrual pain    2. Encounter for initial prescription of contraceptive pills  -Microgestin 1/20, daily    3. Nausea  - ondansetron ODT (ZOFRAN-ODT) 4 MG disintegrating tablet; Take 1 tablet by mouth Every 8 (Eight) Hours As Needed for Nausea or Vomiting.  Dispense: 20 tablet; Refill: 2    Plan: continue mefenamic acid as directed for menstrual cramping; zofran for nausea associated with dysmenorrhea; will start low dose OCP; instructed to take daily at approx the same time, should use another form of birth control for at least 1 cycle, if you miss one pill take as soon as possible, refer to package insert if misses more than one pill; notify office or f/u if side effects of OCP--headache, bleeding between periods, increased nausea      Plan of care reviewed with patient at the conclusion of today's visit. Education was provided in regards to diagnosis, management and any prescribed or recommended OTC medications.  Patient verbalizes Understanding of and agreement with management plan.    FOLLOW-UP  6 month(s) for annual PE with PAP or sooner as needed      Sarah Martins, APRN  11/05/2018

## 2018-11-06 DIAGNOSIS — Z30.011 ENCOUNTER FOR INITIAL PRESCRIPTION OF CONTRACEPTIVE PILLS: Primary | ICD-10-CM

## 2018-11-06 RX ORDER — NORETHINDRONE ACETATE AND ETHINYL ESTRADIOL 1; .02 MG/1; MG/1
1 TABLET ORAL DAILY
Qty: 21 TABLET | Refills: 12 | Status: SHIPPED | OUTPATIENT
Start: 2018-11-06 | End: 2018-11-12

## 2018-11-07 ENCOUNTER — TELEPHONE (OUTPATIENT)
Dept: INTERNAL MEDICINE | Facility: CLINIC | Age: 33
End: 2018-11-07

## 2018-11-07 NOTE — TELEPHONE ENCOUNTER
PATIENT CALLED TO INFORM YOU THAT HER BIRTH CONTROL WAS NOT ACCEPTED BY HER INSURANCE. HER PHARMACY WAS SUPPOSED TO SEND OVER A FORM CONCERNING THIS.  THE PATIENT PREFERRED SOMETHING TO BE SENT IN TONIGHT. SHE WAS INFORMED A NEW BIRTH CONTROL WILL MORE THAN LIKELY NOT BE SENT THIS EVENING BUT IT WILL BE TAKEN CARE OF ACCORDINGLY.

## 2018-11-12 RX ORDER — NORETHINDRONE ACETATE AND ETHINYL ESTRADIOL 1; .02 MG/1; MG/1
1 TABLET ORAL DAILY
Qty: 21 TABLET | Refills: 12 | Status: SHIPPED | OUTPATIENT
Start: 2018-11-12 | End: 2019-11-12

## 2018-11-12 NOTE — TELEPHONE ENCOUNTER
I sent an rx for Junel in to her pharmacy--she may have to call her insurance to see what they will cover as there are so many OCP and I do not know which birth control is covered

## 2018-11-30 ENCOUNTER — TELEPHONE (OUTPATIENT)
Dept: INTERNAL MEDICINE | Facility: CLINIC | Age: 33
End: 2018-11-30

## 2018-12-05 NOTE — TELEPHONE ENCOUNTER
She needs to call the office to schedule; a new pt packet has been mailed to her; office number is [phone]--Dr. Amy Cain, DO with [facility]

## 2019-03-18 ENCOUNTER — OFFICE VISIT (OUTPATIENT)
Dept: INTERNAL MEDICINE | Facility: CLINIC | Age: 34
End: 2019-03-18

## 2019-03-18 VITALS
DIASTOLIC BLOOD PRESSURE: 74 MMHG | SYSTOLIC BLOOD PRESSURE: 116 MMHG | HEART RATE: 65 BPM | HEIGHT: 68 IN | WEIGHT: 135 LBS | OXYGEN SATURATION: 99 % | BODY MASS INDEX: 20.46 KG/M2

## 2019-03-18 DIAGNOSIS — L98.9 SKIN LESION: ICD-10-CM

## 2019-03-18 DIAGNOSIS — M79.7 FIBROMYALGIA: ICD-10-CM

## 2019-03-18 DIAGNOSIS — R53.83 FATIGUE, UNSPECIFIED TYPE: Primary | ICD-10-CM

## 2019-03-18 DIAGNOSIS — Z86.39 HISTORY OF THYROID DISORDER: ICD-10-CM

## 2019-03-18 DIAGNOSIS — M25.50 ARTHRALGIA, UNSPECIFIED JOINT: ICD-10-CM

## 2019-03-18 DIAGNOSIS — E55.9 VITAMIN D INSUFFICIENCY: ICD-10-CM

## 2019-03-18 DIAGNOSIS — Z77.21 EXPOSURE TO BLOOD: ICD-10-CM

## 2019-03-18 LAB
25(OH)D3 SERPL-MCNC: 28.4 NG/ML
ALBUMIN SERPL-MCNC: 4.75 G/DL (ref 3.2–4.8)
ALBUMIN/GLOB SERPL: 2.4 G/DL (ref 1.5–2.5)
ALP SERPL-CCNC: 37 U/L (ref 25–100)
ALT SERPL W P-5'-P-CCNC: 25 U/L (ref 7–40)
ANION GAP SERPL CALCULATED.3IONS-SCNC: 9 MMOL/L (ref 3–11)
AST SERPL-CCNC: 20 U/L (ref 0–33)
B-HCG UR QL: NEGATIVE
BASOPHILS # BLD AUTO: 0.01 10*3/MM3 (ref 0–0.2)
BASOPHILS NFR BLD AUTO: 0.2 % (ref 0–1)
BILIRUB BLD-MCNC: NEGATIVE MG/DL
BILIRUB SERPL-MCNC: 0.6 MG/DL (ref 0.3–1.2)
BUN BLD-MCNC: 14 MG/DL (ref 9–23)
BUN/CREAT SERPL: 18.9 (ref 7–25)
CALCIUM SPEC-SCNC: 9.3 MG/DL (ref 8.7–10.4)
CHLORIDE SERPL-SCNC: 106 MMOL/L (ref 99–109)
CLARITY, POC: CLEAR
CO2 SERPL-SCNC: 24 MMOL/L (ref 20–31)
COLOR UR: YELLOW
CREAT BLD-MCNC: 0.74 MG/DL (ref 0.6–1.3)
DEPRECATED RDW RBC AUTO: 42.1 FL (ref 37–54)
EOSINOPHIL # BLD AUTO: 0.12 10*3/MM3 (ref 0–0.3)
EOSINOPHIL NFR BLD AUTO: 2.1 % (ref 0–3)
ERYTHROCYTE [DISTWIDTH] IN BLOOD BY AUTOMATED COUNT: 12.6 % (ref 11.3–14.5)
ERYTHROCYTE [SEDIMENTATION RATE] IN BLOOD: 4 MM/HR (ref 0–20)
GFR SERPL CREATININE-BSD FRML MDRD: 90 ML/MIN/1.73
GLOBULIN UR ELPH-MCNC: 2 GM/DL
GLUCOSE BLD-MCNC: 104 MG/DL (ref 70–100)
GLUCOSE UR STRIP-MCNC: NEGATIVE MG/DL
HBA1C MFR BLD: 4.7 % (ref 4.8–5.6)
HCT VFR BLD AUTO: 37.9 % (ref 34.5–44)
HGB BLD-MCNC: 13 G/DL (ref 11.5–15.5)
IMM GRANULOCYTES # BLD AUTO: 0.01 10*3/MM3 (ref 0–0.05)
IMM GRANULOCYTES NFR BLD AUTO: 0.2 % (ref 0–0.6)
INTERNAL NEGATIVE CONTROL: NEGATIVE
INTERNAL POSITIVE CONTROL: POSITIVE
KETONES UR QL: NEGATIVE
LEUKOCYTE EST, POC: NEGATIVE
LYMPHOCYTES # BLD AUTO: 1.39 10*3/MM3 (ref 0.6–4.8)
LYMPHOCYTES NFR BLD AUTO: 24.7 % (ref 24–44)
Lab: NORMAL
MCH RBC QN AUTO: 31.6 PG (ref 27–31)
MCHC RBC AUTO-ENTMCNC: 34.3 G/DL (ref 32–36)
MCV RBC AUTO: 92 FL (ref 80–99)
MONOCYTES # BLD AUTO: 0.32 10*3/MM3 (ref 0–1)
MONOCYTES NFR BLD AUTO: 5.7 % (ref 0–12)
NEUTROPHILS # BLD AUTO: 3.77 10*3/MM3 (ref 1.5–8.3)
NEUTROPHILS NFR BLD AUTO: 67.1 % (ref 41–71)
NITRITE UR-MCNC: NEGATIVE MG/ML
PH UR: 5 [PH] (ref 5–8)
PLATELET # BLD AUTO: 247 10*3/MM3 (ref 150–450)
PMV BLD AUTO: 11.3 FL (ref 6–12)
POTASSIUM BLD-SCNC: 3.9 MMOL/L (ref 3.5–5.5)
PROT SERPL-MCNC: 6.7 G/DL (ref 5.7–8.2)
PROT UR STRIP-MCNC: NEGATIVE MG/DL
RBC # BLD AUTO: 4.12 10*6/MM3 (ref 3.89–5.14)
RBC # UR STRIP: NEGATIVE /UL
SODIUM BLD-SCNC: 139 MMOL/L (ref 132–146)
SP GR UR: 1.01 (ref 1–1.03)
T4 FREE SERPL-MCNC: 1.02 NG/DL (ref 0.89–1.76)
TSH SERPL DL<=0.05 MIU/L-ACNC: 4.18 MIU/ML (ref 0.35–5.35)
UROBILINOGEN UR QL: NORMAL
WBC NRBC COR # BLD: 5.62 10*3/MM3 (ref 3.5–10.8)

## 2019-03-18 PROCEDURE — 86430 RHEUMATOID FACTOR TEST QUAL: CPT | Performed by: NURSE PRACTITIONER

## 2019-03-18 PROCEDURE — 99214 OFFICE O/P EST MOD 30 MIN: CPT | Performed by: NURSE PRACTITIONER

## 2019-03-18 PROCEDURE — 81025 URINE PREGNANCY TEST: CPT | Performed by: NURSE PRACTITIONER

## 2019-03-18 PROCEDURE — 80053 COMPREHEN METABOLIC PANEL: CPT | Performed by: NURSE PRACTITIONER

## 2019-03-18 PROCEDURE — 80074 ACUTE HEPATITIS PANEL: CPT | Performed by: NURSE PRACTITIONER

## 2019-03-18 PROCEDURE — 82607 VITAMIN B-12: CPT | Performed by: NURSE PRACTITIONER

## 2019-03-18 PROCEDURE — 84443 ASSAY THYROID STIM HORMONE: CPT | Performed by: NURSE PRACTITIONER

## 2019-03-18 PROCEDURE — 83036 HEMOGLOBIN GLYCOSYLATED A1C: CPT | Performed by: NURSE PRACTITIONER

## 2019-03-18 PROCEDURE — 86038 ANTINUCLEAR ANTIBODIES: CPT | Performed by: NURSE PRACTITIONER

## 2019-03-18 PROCEDURE — G0432 EIA HIV-1/HIV-2 SCREEN: HCPCS | Performed by: NURSE PRACTITIONER

## 2019-03-18 PROCEDURE — 84439 ASSAY OF FREE THYROXINE: CPT | Performed by: NURSE PRACTITIONER

## 2019-03-18 PROCEDURE — 81003 URINALYSIS AUTO W/O SCOPE: CPT | Performed by: NURSE PRACTITIONER

## 2019-03-18 PROCEDURE — 82306 VITAMIN D 25 HYDROXY: CPT | Performed by: NURSE PRACTITIONER

## 2019-03-18 PROCEDURE — 85025 COMPLETE CBC W/AUTO DIFF WBC: CPT | Performed by: NURSE PRACTITIONER

## 2019-03-18 NOTE — PROGRESS NOTES
Chief Complaint   Patient presents with   • Fatigue   • Pain     Mainly in joints   • Anxiety     Chest discomfort/vision issues, forgetfulness, Over-thinking, Can't stay focused/retain information       History of Present Illness  33 y.o.female presents for [facility] care same office new provider, fatigue, pain, anxiety.  Previously followed by Sarah Bynum.    Patient's primary complaint is generalized fatigue all the time.  Onset of symptoms over a year with significant worsening of her fatigue over the last few months.  Tired, sleeping, inability to stay focused when at work.  In addition to her profound fatigue she has joint pain multiple locations knees, elbows, upper back.  Just crossing her legs hurts.  If she even rests her elbows on the table she has pain.  Her generalized pain has also significantly increased over the last couple months as well much all the time since early January this year.  can even have generalized muscle tenderness to touch throughout her body.  Denies any numbness tingling type sensations.    States just does not feel right.  She feels confused at times cannot focus; having difficulties at work with activities.  Sometimes this may only occur 2-3 times per week and sometimes may occur 2-3 times per day.  When she has these feelings also her vision seems to change like her vision is moving around.  She has also had a couple episodes where she drops stuff and feels like she needs disconnected from activities.  No blackouts or loss of consciousness.  No history of seizures.      Denies any history of depression or feelings of sadness.  She does have some anxiety but feels like that is probably more related to her worsening symptoms above and not knowing what is going on.    Eating drinking okay stays hydrated okay.  She does have some intermittent bloating with occasional constipation otherwise no GI complaints.  No weight loss or change in appetite.  Tries to eat a healthy diet and gets  regular exercise    Has been told in the past she was vitamin D deficient and needed to start on a supplement however she did not take that medication and she is interested in getting her vitamin D rechecked.    She works in sterile processing and had a curette recently to drop on her foot a few months back and she did have a blood exposure.  She would like to get some updated blood work.    Takes birth control Microgestin.  She was late starting her pill pack this month.  She is due to start her period on the 20th and a couple of days.  Patient would like to have a pregnancy test done just to make sure no issues.    Complains of skin lesions located on the left gluteal area as well as throughout lateral thigh; has been there for over a year but the one on the left gluteal area is changing in size and color.  Does not look like a regular mole is more red and raised in color; would like to see dermatologist.    History of remote autoimmune thyroid disorder no recent thyroid medications or treatment.      Review of Systems   Constitutional: Positive for fatigue. Negative for activity change, appetite change, chills, diaphoresis, fever, unexpected weight gain and unexpected weight loss.   HENT: Negative for congestion, rhinorrhea, sinus pressure, sneezing, sore throat and trouble swallowing.    Eyes: Positive for visual disturbance. Negative for blurred vision and photophobia.   Respiratory: Negative for cough, chest tightness, shortness of breath and wheezing.    Cardiovascular: Negative for chest pain, palpitations and leg swelling.   Gastrointestinal: Positive for abdominal distention and constipation. Negative for abdominal pain, blood in stool, diarrhea, nausea, vomiting, GERD and indigestion.   Endocrine: Negative for cold intolerance, heat intolerance, polydipsia, polyphagia and polyuria.   Genitourinary: Negative for difficulty urinating, dysuria, flank pain, hematuria and pelvic pain.   Musculoskeletal:  "Positive for arthralgias, back pain and myalgias. Negative for gait problem and joint swelling.   Skin: Positive for skin lesions. Negative for rash.   Neurological: Positive for dizziness, memory problem and confusion. Negative for tremors, seizures, syncope, speech difficulty, weakness, numbness and headache.   Hematological: Negative for adenopathy. Does not bruise/bleed easily.   Psychiatric/Behavioral: Positive for decreased concentration. Negative for self-injury, sleep disturbance, suicidal ideas, depressed mood and stress. The patient is nervous/anxious.          [facility]  The following portions of the patient's history were reviewed and updated as appropriate: allergies, current medications, past family history, past medical history, past social history, past surgical history and problem list.     Social hx:  Tobacco vaping  Alcohol  Past Medical History:   Diagnosis Date   • Depression    • Hypothyroid    • Pancreatitis       History reviewed. No pertinent surgical history.   No Known Allergies   Family History   Problem Relation Age of Onset   • Diabetes Mother    • Hyperlipidemia Mother    • Diabetes Father    • Hyperlipidemia Father    • Hypertension Father    • COPD Maternal Grandmother    • COPD Maternal Grandfather    • COPD Paternal Grandmother    • COPD Paternal Grandfather             Current Outpatient Medications:   •  norethindrone-ethinyl estradiol (MICROGESTIN 1/20) 1-20 MG-MCG per tablet, Take 1 tablet by mouth Daily., Disp: 21 tablet, Rfl: 12  •  ondansetron ODT (ZOFRAN-ODT) 4 MG disintegrating tablet, Take 1 tablet by mouth Every 8 (Eight) Hours As Needed for Nausea or Vomiting., Disp: 20 tablet, Rfl: 2    VITALS:  /74   Pulse 65   Ht 172.7 cm (68\")   Wt 61.2 kg (135 lb)   SpO2 99%   Breastfeeding? No   BMI 20.53 kg/m²     Physical Exam   Constitutional: She is oriented to person, place, and time. She appears well-developed and well-nourished. No distress.   HENT:   Head: " Normocephalic.   Right Ear: External ear normal.   Left Ear: External ear normal.   Nose: Nose normal.   Mouth/Throat: Oropharynx is clear and moist.   Eyes: EOM are normal. Pupils are equal, round, and reactive to light.   Neck: Normal range of motion. Neck supple.   Cardiovascular: Normal rate, regular rhythm, normal heart sounds and intact distal pulses.   Pulmonary/Chest: Effort normal and breath sounds normal. No respiratory distress.   Abdominal: Soft. Bowel sounds are normal. There is no tenderness.   Musculoskeletal: Normal range of motion.   Normal ROM all major joints   Lymphadenopathy:     She has no cervical adenopathy.   Neurological: She is alert and oriented to person, place, and time.   Skin: Skin is warm and dry. Capillary refill takes less than 2 seconds. No rash noted.   Psychiatric: Her speech is normal and behavior is normal. Her mood appears anxious.        LABS  Results for orders placed or performed in visit on 03/18/19   Comprehensive Metabolic Panel   Result Value Ref Range    Glucose 104 (H) 70 - 100 mg/dL    BUN 14 9 - 23 mg/dL    Creatinine 0.74 0.60 - 1.30 mg/dL    Sodium 139 132 - 146 mmol/L    Potassium 3.9 3.5 - 5.5 mmol/L    Chloride 106 99 - 109 mmol/L    CO2 24.0 20.0 - 31.0 mmol/L    Calcium 9.3 8.7 - 10.4 mg/dL    Total Protein 6.7 5.7 - 8.2 g/dL    Albumin 4.75 3.20 - 4.80 g/dL    ALT (SGPT) 25 7 - 40 U/L    AST (SGOT) 20 0 - 33 U/L    Alkaline Phosphatase 37 25 - 100 U/L    Total Bilirubin 0.6 0.3 - 1.2 mg/dL    eGFR Non African Amer 90 >60 mL/min/1.73    Globulin 2.0 gm/dL    A/G Ratio 2.4 1.5 - 2.5 g/dL    BUN/Creatinine Ratio 18.9 7.0 - 25.0    Anion Gap 9.0 3.0 - 11.0 mmol/L   CBC Auto Differential   Result Value Ref Range    WBC 5.62 3.50 - 10.80 10*3/mm3    RBC 4.12 3.89 - 5.14 10*6/mm3    Hemoglobin 13.0 11.5 - 15.5 g/dL    Hematocrit 37.9 34.5 - 44.0 %    MCV 92.0 80.0 - 99.0 fL    MCH 31.6 (H) 27.0 - 31.0 pg    MCHC 34.3 32.0 - 36.0 g/dL    RDW 12.6 11.3 - 14.5 %     RDW-SD 42.1 37.0 - 54.0 fl    MPV 11.3 6.0 - 12.0 fL    Platelets 247 150 - 450 10*3/mm3    Neutrophil % 67.1 41.0 - 71.0 %    Lymphocyte % 24.7 24.0 - 44.0 %    Monocyte % 5.7 0.0 - 12.0 %    Eosinophil % 2.1 0.0 - 3.0 %    Basophil % 0.2 0.0 - 1.0 %    Immature Grans % 0.2 0.0 - 0.6 %    Neutrophils, Absolute 3.77 1.50 - 8.30 10*3/mm3    Lymphocytes, Absolute 1.39 0.60 - 4.80 10*3/mm3    Monocytes, Absolute 0.32 0.00 - 1.00 10*3/mm3    Eosinophils, Absolute 0.12 0.00 - 0.30 10*3/mm3    Basophils, Absolute 0.01 0.00 - 0.20 10*3/mm3    Immature Grans, Absolute 0.01 0.00 - 0.05 10*3/mm3   TSH   Result Value Ref Range    TSH 4.176 0.350 - 5.350 mIU/mL   Hemoglobin A1c   Result Value Ref Range    Hemoglobin A1C 4.70 (L) 4.80 - 5.60 %   Sedimentation Rate   Result Value Ref Range    Sed Rate 4 0 - 20 mm/hr   PA   Result Value Ref Range    PA Direct Negative Negative   Rheumatoid Factor   Result Value Ref Range    Rheumatoid Factor Qualitative Negative Negative   Hepatitis Panel, Acute   Result Value Ref Range    Hepatitis B Surface Ag Non-Reactive Non-Reactive    Hep A IgM Non-Reactive Non-Reactive    Hep B C IgM Non-Reactive Non-Reactive    Hepatitis C Ab Non-Reactive Non-Reactive   HIV-1 / O / 2 Ag / Antibody 4th Generation   Result Value Ref Range    HIV-1/ HIV-2 Non-Reactive Non-Reactive   Vitamin D 25 Hydroxy   Result Value Ref Range    25 Hydroxy, Vitamin D 28.4 ng/ml   Vitamin B12   Result Value Ref Range    Vitamin B-12 438 211 - 911 pg/mL   T4, Free   Result Value Ref Range    Free T4 1.02 0.89 - 1.76 ng/dL   POCT pregnancy, urine   Result Value Ref Range    HCG, Urine, QL Negative Negative    Lot Number hcg     Internal Positive Control Positive     Internal Negative Control Negative    POC Urinalysis Dipstick, Automated   Result Value Ref Range    Color Yellow Yellow, Straw, Dark Yellow, Itzel    Clarity, UA Clear Clear    Specific Gravity  1.015 1.005 - 1.030    pH, Urine 5.0 5.0 - 8.0    Leukocytes  Negative Negative    Nitrite, UA Negative Negative    Protein, POC Negative Negative mg/dL    Glucose, UA Negative Negative, 1000 mg/dL (3+) mg/dL    Ketones, UA Negative Negative    Urobilinogen, UA Normal Normal    Bilirubin Negative Negative    Blood, UA Negative Negative       ASSESSMENT/PLAN  Estefany was seen today for fatigue, pain and anxiety.    Diagnoses and all orders for this visit:    Fatigue, unspecified type  -     POCT pregnancy, urine  -     Comprehensive Metabolic Panel  -     CBC Auto Differential  -     TSH  -     POC Urinalysis Dipstick, Automated  -     Hemoglobin A1c  -     Vitamin D 25 Hydroxy  -     Vitamin B12  -     T4, Free  -     Ambulatory Referral to Rheumatology    History of thyroid disorder  -     TSH  -     T4, Free    Arthralgia, unspecified joint  -     Sedimentation Rate  -     PA  -     Rheumatoid Factor  -     Ambulatory Referral to Rheumatology    Skin lesion  -     Ambulatory Referral to Dermatology    Exposure to blood  -     Hepatitis Panel, Acute  -     HIV-1 / O / 2 Ag / Antibody 4th Generation    Fibromyalgia    Vitamin D insufficiency  Comments:  take OTC vit D3 1000 units daily.    I would like her to see rheumatology for further eval of possible fibromyalgia.  Her labs are completely normal but with with widespread fatigue, hypersensitive pain, mind fogginess etc certainly fits the presentation of such.    Today I have spent a total of 25 minutes face to face with Estefany Jesus.  During this time, a total of 15 minutes was spent counseling on the nature of the diagnosis including risks and benefits of treatment, complications, implications, management, safe and proper use of medications.  We discussed the work up and specialist referral process if needed.  I encouraged compliance with follow up appointments and specialists referrals.    I discussed the patients findings and my recommendations with patient.  Patient was encouraged to keep me informed of any acute  changes, lack of improvement, or any new concerning symptoms.    Patient voiced understanding of all instructions and denied further questions.      FOLLOW-UP  Return in about 3 months (around 6/18/2019), or if symptoms worsen or fail to improve.    Electronically signed by:    ANGELINA Dorsey  03/18/2019

## 2019-03-19 LAB
HAV IGM SERPL QL IA: NORMAL
HBV CORE IGM SERPL QL IA: NORMAL
HBV SURFACE AG SERPL QL IA: NORMAL
HCV AB SER DONR QL: NORMAL
HIV1+2 AB SER QL: NORMAL
RHEUMATOID FACT SERPL-ACNC: NEGATIVE [IU]/ML
VIT B12 BLD-MCNC: 438 PG/ML (ref 211–911)

## 2019-03-20 LAB — ANA SER QL: NEGATIVE

## 2019-03-22 ENCOUNTER — TELEPHONE (OUTPATIENT)
Dept: INTERNAL MEDICINE | Facility: CLINIC | Age: 34
End: 2019-03-22

## 2019-03-22 NOTE — TELEPHONE ENCOUNTER
----- Message from ANGELINA Reynolds sent at 3/22/2019  9:06 AM EDT -----  Please call patient with results.  HIV and hepatitis panel negative.  Rheumatoid arthritis, inflammatory, and autoimmune labs normal.  Thyroid labs normal.  Vitamin D level slightly low at 28.4.  She can take an over-the-counter vitamin D supplement 1000 units daily.  Diabetes screening normal.  Electrolytes normal.  No anemia.  I do not see anything on her lab work at all to account for her multi-symptoms.  Her presentation does sound somewhat like fibromyalgia.  I would like for her to see a rheumatologist and I have made a referral someone should be contacting her for appointment.

## 2019-05-14 ENCOUNTER — TELEPHONE (OUTPATIENT)
Dept: INTERNAL MEDICINE | Facility: CLINIC | Age: 34
End: 2019-05-14

## 2019-05-14 NOTE — TELEPHONE ENCOUNTER
Pt called in and states that she mixed a pill  Of her BC. And she had a period for 9 days and she started her a new pack and states that 2 weeks in she is having another period.   She thinks that she missed something up, because when she missed the dose she doubled up the next day.

## 2019-05-14 NOTE — TELEPHONE ENCOUNTER
She did the right thing; taking double dose the next day if she missed one pill.  It may take a couple cycles to reset.  Also if this new period 2 weeks in is light might also consider taking a pregnancy test to make sure.

## 2019-05-15 NOTE — TELEPHONE ENCOUNTER
Called pt and lvm saying sorry  that she was given information that was not appropriate  And that Lakisha states she was right for doubling up the dose and voiced understanding that she states she is not pregnant but if sexually active she just needed to get tested, there is no need for a new pack as it will regulate itself out, per lakisha

## 2019-05-15 NOTE — TELEPHONE ENCOUNTER
Pt called back and she states she has just stopped the meds and she will start back on it next month.   Per her own thoughts.

## 2019-05-15 NOTE — TELEPHONE ENCOUNTER
PT RETURNING PHONE CALL. SHE SAID SHE KNOWS SHE IS NOT PREGNANT-SHE TAKES THE BC FOR REGULATION.    PT STOPPED TAKING IT MID PACK BECAUSE SHE STARTED HER PERIOD. SHE IS WONDERING IF SHE NEEDS TO CONTINUE THIS PACK ONCE HER PERIOD IS COMPLETE, OR IF SHE SHOULD STOP TAKING IT FOR A MONTH AND WAIT UNTIL HER NEXT PERIOD.    ALSO IF WONDERING IF SHE NEEDS A NEW PACK WITH DIFFERENT DOSE?    PLEASE ADVISE.    PER PT-CAN LEAVE A DETAILED VM, SHE WILL BE UNABLE TO ANSWER HER PHONE WHILE AT WORK.

## 2019-11-06 ENCOUNTER — TELEPHONE (OUTPATIENT)
Dept: INTERNAL MEDICINE | Facility: CLINIC | Age: 34
End: 2019-11-06

## 2019-11-06 NOTE — TELEPHONE ENCOUNTER
LVM for pt to return call to reschedule her physical appt with [facility] that was for tomorrow for another available date

## 2019-11-17 ENCOUNTER — OFFICE VISIT (OUTPATIENT)
Dept: INTERNAL MEDICINE | Facility: CLINIC | Age: 34
End: 2019-11-17

## 2019-11-17 VITALS
WEIGHT: 133 LBS | HEIGHT: 68 IN | OXYGEN SATURATION: 99 % | SYSTOLIC BLOOD PRESSURE: 110 MMHG | BODY MASS INDEX: 20.16 KG/M2 | DIASTOLIC BLOOD PRESSURE: 80 MMHG | HEART RATE: 79 BPM

## 2019-11-17 DIAGNOSIS — N88.9 ABNORMAL APPEARANCE OF CERVIX: ICD-10-CM

## 2019-11-17 DIAGNOSIS — Z00.00 ANNUAL PHYSICAL EXAM: Primary | ICD-10-CM

## 2019-11-17 DIAGNOSIS — N88.8 NABOTHIAN CYST: ICD-10-CM

## 2019-11-17 DIAGNOSIS — Z30.09 BIRTH CONTROL COUNSELING: ICD-10-CM

## 2019-11-17 LAB
ALBUMIN SERPL-MCNC: 5.1 G/DL (ref 3.5–5.2)
ALBUMIN/GLOB SERPL: 1.7 G/DL
ALP SERPL-CCNC: 47 U/L (ref 39–117)
ALT SERPL W P-5'-P-CCNC: 37 U/L (ref 1–33)
ANION GAP SERPL CALCULATED.3IONS-SCNC: 17 MMOL/L (ref 5–15)
AST SERPL-CCNC: 27 U/L (ref 1–32)
BASOPHILS # BLD AUTO: 0.04 10*3/MM3 (ref 0–0.2)
BASOPHILS NFR BLD AUTO: 0.6 % (ref 0–1.5)
BILIRUB BLD-MCNC: NEGATIVE MG/DL
BILIRUB SERPL-MCNC: 1 MG/DL (ref 0.2–1.2)
BUN BLD-MCNC: 10 MG/DL (ref 6–20)
BUN/CREAT SERPL: 14.1 (ref 7–25)
CALCIUM SPEC-SCNC: 9.4 MG/DL (ref 8.6–10.5)
CHLORIDE SERPL-SCNC: 96 MMOL/L (ref 98–107)
CLARITY, POC: CLEAR
CO2 SERPL-SCNC: 26 MMOL/L (ref 22–29)
COLOR UR: YELLOW
CREAT BLD-MCNC: 0.71 MG/DL (ref 0.57–1)
DEPRECATED RDW RBC AUTO: 39.9 FL (ref 37–54)
EOSINOPHIL # BLD AUTO: 0.09 10*3/MM3 (ref 0–0.4)
EOSINOPHIL NFR BLD AUTO: 1.4 % (ref 0.3–6.2)
ERYTHROCYTE [DISTWIDTH] IN BLOOD BY AUTOMATED COUNT: 11.8 % (ref 12.3–15.4)
GFR SERPL CREATININE-BSD FRML MDRD: 94 ML/MIN/1.73
GLOBULIN UR ELPH-MCNC: 3 GM/DL
GLUCOSE BLD-MCNC: 55 MG/DL (ref 65–99)
GLUCOSE UR STRIP-MCNC: NEGATIVE MG/DL
HCT VFR BLD AUTO: 46.8 % (ref 34–46.6)
HGB BLD-MCNC: 15.6 G/DL (ref 12–15.9)
IMM GRANULOCYTES # BLD AUTO: 0.02 10*3/MM3 (ref 0–0.05)
IMM GRANULOCYTES NFR BLD AUTO: 0.3 % (ref 0–0.5)
KETONES UR QL: ABNORMAL
LEUKOCYTE EST, POC: NEGATIVE
LYMPHOCYTES # BLD AUTO: 1.55 10*3/MM3 (ref 0.7–3.1)
LYMPHOCYTES NFR BLD AUTO: 24.9 % (ref 19.6–45.3)
MCH RBC QN AUTO: 31 PG (ref 26.6–33)
MCHC RBC AUTO-ENTMCNC: 33.3 G/DL (ref 31.5–35.7)
MCV RBC AUTO: 93 FL (ref 79–97)
MONOCYTES # BLD AUTO: 0.31 10*3/MM3 (ref 0.1–0.9)
MONOCYTES NFR BLD AUTO: 5 % (ref 5–12)
NEUTROPHILS # BLD AUTO: 4.22 10*3/MM3 (ref 1.7–7)
NEUTROPHILS NFR BLD AUTO: 67.8 % (ref 42.7–76)
NITRITE UR-MCNC: NEGATIVE MG/ML
NRBC BLD AUTO-RTO: 0 /100 WBC (ref 0–0.2)
PH UR: 7 [PH] (ref 5–8)
PLATELET # BLD AUTO: 288 10*3/MM3 (ref 140–450)
PMV BLD AUTO: 11.4 FL (ref 6–12)
POTASSIUM BLD-SCNC: 4.2 MMOL/L (ref 3.5–5.2)
PROT SERPL-MCNC: 8.1 G/DL (ref 6–8.5)
PROT UR STRIP-MCNC: NEGATIVE MG/DL
RBC # BLD AUTO: 5.03 10*6/MM3 (ref 3.77–5.28)
RBC # UR STRIP: NEGATIVE /UL
SODIUM BLD-SCNC: 139 MMOL/L (ref 136–145)
SP GR UR: 1.01 (ref 1–1.03)
TSH SERPL DL<=0.05 MIU/L-ACNC: 3.43 UIU/ML (ref 0.27–4.2)
UROBILINOGEN UR QL: NORMAL
WBC NRBC COR # BLD: 6.23 10*3/MM3 (ref 3.4–10.8)

## 2019-11-17 PROCEDURE — 80053 COMPREHEN METABOLIC PANEL: CPT | Performed by: NURSE PRACTITIONER

## 2019-11-17 PROCEDURE — 84443 ASSAY THYROID STIM HORMONE: CPT | Performed by: NURSE PRACTITIONER

## 2019-11-17 PROCEDURE — 99395 PREV VISIT EST AGE 18-39: CPT | Performed by: NURSE PRACTITIONER

## 2019-11-17 PROCEDURE — 81003 URINALYSIS AUTO W/O SCOPE: CPT | Performed by: NURSE PRACTITIONER

## 2019-11-17 PROCEDURE — 85025 COMPLETE CBC W/AUTO DIFF WBC: CPT | Performed by: NURSE PRACTITIONER

## 2019-11-17 PROCEDURE — 82306 VITAMIN D 25 HYDROXY: CPT | Performed by: NURSE PRACTITIONER

## 2019-11-17 RX ORDER — BIOTIN 800 MCG
TABLET ORAL
COMMUNITY
End: 2021-09-29

## 2019-11-17 RX ORDER — IBUPROFEN 200 MG
1 TABLET ORAL EVERY 4 HOURS
COMMUNITY

## 2019-11-17 NOTE — PROGRESS NOTES
Chief Complaint   Patient presents with   • Annual Exam     with pap   • Contraception     wants to start BC (pill form)       HPI:  Estefany Jesus 34 y.o. female who presents for an Annual Physical.  Estefany has a history of hypothyroidism; transient does not require medication. Menorrhagia chronic years. Was on kenn before felt like made her depressed. Would like to try birth control again. . Prior bc nov 2018 microgestin 1-20. Estefany has been doing well. Plan to update vaccines if needed today. Plan for pap today. Reports feels like there is a lump on her cervix.  Last pap may 2018; co razor burn in oniel area  No pain with sex or pelvic pain  + Cloudy urine    Patient currently follows a heart healthy diet and gets routine exercise. Walks 2 miles a day.  Quit vaping using nicotine gum 2 weeks.  Wears seatbelt:  yes  Fire alarms in home: yes  Sunscreen use: yes  eye exam: 2 years ago  Dental exam: last week      Review of Systems   Constitutional: Negative for chills and fever.   HENT: Negative.    Eyes: Negative.    Respiratory: Negative for shortness of breath.    Cardiovascular: Negative for chest pain, palpitations and leg swelling.   Gastrointestinal: Negative for abdominal pain, constipation, diarrhea, nausea and vomiting.   Genitourinary: Negative for difficulty urinating and dysuria.   Musculoskeletal: Negative for arthralgias.   Skin: Negative for rash.   Neurological: Negative for headache.   Psychiatric/Behavioral: Negative for depressed mood. The patient is not nervous/anxious.          [facility]  The following portions of the patient's history were reviewed and updated as appropriate: allergies, current medications, past family history, past medical history, past social history, past surgical history and problem list.     Social history:  Tobacco use quit vaping; using nicotine gum no cigarettes.  Alcohol use none  Past Medical History:   Diagnosis Date   • Depression    • Hypothyroid    •  "Pancreatitis       History reviewed. No pertinent surgical history.     There is no immunization history on file for this patient.  Health Maintenance   Topic Date Due   • PNEUMOCOCCAL VACCINE (19-64 MEDIUM RISK) (1 of 1 - PPSV23) 08/31/2004   • TDAP/TD VACCINES (1 - Tdap) 08/31/2004   • ANNUAL PHYSICAL  11/18/2020   • PAP SMEAR  05/07/2021   • INFLUENZA VACCINE  Completed     No Known Allergies   Family History   Problem Relation Age of Onset   • Diabetes Mother    • Hyperlipidemia Mother    • Diabetes Father    • Hyperlipidemia Father    • Hypertension Father    • COPD Maternal Grandmother    • COPD Maternal Grandfather    • COPD Paternal Grandmother    • COPD Paternal Grandfather           Current Outpatient Medications:   •  Biotin 800 MCG tablet, one tablet daily, Disp: , Rfl:   •  ibuprofen (ADVIL) 200 MG tablet, Take 1 tablet by mouth Every 4 (Four) Hours., Disp: , Rfl:   •  Multiple Vitamins-Minerals (MULTIVITAMIN ADULT EXTRA C PO), Take 1 tablet by mouth., Disp: , Rfl:     VITALS:  /80   Pulse 79   Ht 172.7 cm (68\")   Wt 60.3 kg (133 lb)   SpO2 99%   BMI 20.22 kg/m²     Physical Exam   Constitutional: She is oriented to person, place, and time. She appears well-developed and well-nourished. No distress.   HENT:   Head: Normocephalic.   Right Ear: External ear normal.   Left Ear: External ear normal.   Nose: Nose normal.   Mouth/Throat: Oropharynx is clear and moist.   Eyes: Conjunctivae and EOM are normal. Pupils are equal, round, and reactive to light. Right eye exhibits no discharge. Left eye exhibits no discharge.   Neck: Normal range of motion. Neck supple. No thyromegaly present.   Cardiovascular: Normal rate, regular rhythm, normal heart sounds and intact distal pulses.   Pulmonary/Chest: Effort normal and breath sounds normal. No respiratory distress. She exhibits no mass and no tenderness. Right breast exhibits no inverted nipple, no mass, no nipple discharge, no skin change and no " tenderness. Left breast exhibits no inverted nipple, no mass, no nipple discharge, no skin change and no tenderness.   Abdominal: Soft. Bowel sounds are normal. There is no tenderness.   Genitourinary: Vagina normal and uterus normal. Pelvic exam was performed with patient supine. There is no rash, tenderness, lesion or Bartholin's cyst on the right labia. There is no rash, tenderness, lesion or Bartholin's cyst on the left labia. Cervix exhibits discharge, lesion and nabothian cyst. Right adnexum displays no mass, no tenderness and no fullness. Left adnexum displays no mass, no tenderness and no fullness.       Musculoskeletal: Normal range of motion.   Normal ROM all major joints   Lymphadenopathy:     She has no cervical adenopathy.        Right cervical: No superficial cervical adenopathy present.       Left cervical: No superficial cervical adenopathy present.     She has no axillary adenopathy.   Neurological: She is alert and oriented to person, place, and time.   Skin: Skin is warm and dry. Capillary refill takes less than 2 seconds. No rash noted.   Psychiatric: She has a normal mood and affect. Her behavior is normal.   Vitals reviewed.      LABS  Results for orders placed or performed in visit on 11/17/19   Comprehensive Metabolic Panel   Result Value Ref Range    Glucose 55 (L) 65 - 99 mg/dL    BUN 10 6 - 20 mg/dL    Creatinine 0.71 0.57 - 1.00 mg/dL    Sodium 139 136 - 145 mmol/L    Potassium 4.2 3.5 - 5.2 mmol/L    Chloride 96 (L) 98 - 107 mmol/L    CO2 26.0 22.0 - 29.0 mmol/L    Calcium 9.4 8.6 - 10.5 mg/dL    Total Protein 8.1 6.0 - 8.5 g/dL    Albumin 5.10 3.50 - 5.20 g/dL    ALT (SGPT) 37 (H) 1 - 33 U/L    AST (SGOT) 27 1 - 32 U/L    Alkaline Phosphatase 47 39 - 117 U/L    Total Bilirubin 1.0 0.2 - 1.2 mg/dL    eGFR Non African Amer 94 >60 mL/min/1.73    Globulin 3.0 gm/dL    A/G Ratio 1.7 g/dL    BUN/Creatinine Ratio 14.1 7.0 - 25.0    Anion Gap 17.0 (H) 5.0 - 15.0 mmol/L   TSH   Result Value Ref  Range    TSH 3.430 0.270 - 4.200 uIU/mL   Vitamin D 25 Hydroxy   Result Value Ref Range    25 Hydroxy, Vitamin D 32.1 30.0 - 100.0 ng/ml   CBC Auto Differential   Result Value Ref Range    WBC 6.23 3.40 - 10.80 10*3/mm3    RBC 5.03 3.77 - 5.28 10*6/mm3    Hemoglobin 15.6 12.0 - 15.9 g/dL    Hematocrit 46.8 (H) 34.0 - 46.6 %    MCV 93.0 79.0 - 97.0 fL    MCH 31.0 26.6 - 33.0 pg    MCHC 33.3 31.5 - 35.7 g/dL    RDW 11.8 (L) 12.3 - 15.4 %    RDW-SD 39.9 37.0 - 54.0 fl    MPV 11.4 6.0 - 12.0 fL    Platelets 288 140 - 450 10*3/mm3    Neutrophil % 67.8 42.7 - 76.0 %    Lymphocyte % 24.9 19.6 - 45.3 %    Monocyte % 5.0 5.0 - 12.0 %    Eosinophil % 1.4 0.3 - 6.2 %    Basophil % 0.6 0.0 - 1.5 %    Immature Grans % 0.3 0.0 - 0.5 %    Neutrophils, Absolute 4.22 1.70 - 7.00 10*3/mm3    Lymphocytes, Absolute 1.55 0.70 - 3.10 10*3/mm3    Monocytes, Absolute 0.31 0.10 - 0.90 10*3/mm3    Eosinophils, Absolute 0.09 0.00 - 0.40 10*3/mm3    Basophils, Absolute 0.04 0.00 - 0.20 10*3/mm3    Immature Grans, Absolute 0.02 0.00 - 0.05 10*3/mm3    nRBC 0.0 0.0 - 0.2 /100 WBC   POC Urinalysis Dipstick, Automated   Result Value Ref Range    Color Yellow Yellow, Straw, Dark Yellow, Itzel    Clarity, UA Clear Clear    Specific Gravity  1.015 1.005 - 1.030    pH, Urine 7.0 5.0 - 8.0    Leukocytes Negative Negative    Nitrite, UA Negative Negative    Protein, POC Negative Negative mg/dL    Glucose, UA Negative Negative, 1000 mg/dL (3+) mg/dL    Ketones, UA 50 mg/dL (A) Negative    Urobilinogen, UA Normal Normal    Bilirubin Negative Negative    Blood, UA Negative Negative       ASSESSMENT/PLAN  Estefany was seen today for annual exam and contraception.    Diagnoses and all orders for this visit:    Annual physical exam  -     CBC & Differential  -     Comprehensive Metabolic Panel  -     POC Urinalysis Dipstick, Automated  -     Liquid-based Pap Smear, Screening; Future  -     TSH  -     Vitamin D 25 Hydroxy  -     CBC Auto Differential    Birth  control counseling  -     Norethin-Eth Estrad-Fe Biphas (LO LOESTRIN FE) 1 MG-10 MCG / 10 MCG tablet; Take 1 tablet by mouth Daily.  I have reviewed risks/benefits and potential side effects of various hormonal and non-hormonal contraception options.  Patient voiced understanding and wishes to proceed with lo loestrin.   Informational handout specific to this medication has been provided for patient review.  Nabothian cyst  Cervical lump about 12oclock with appearance of nabothian cyst    Abnormal appearance of cervix  -     Ambulatory Referral to Gynecology  Pap was negative for malignancy and hpv.    I would like her to follow up with gyn for further eval to confirm is a nabothian cyst and further assess the cervical lesions about 4 and 8 oclock.  Pt updated on plan of care.    Nutrition and activity goals reviewed including: mainly water to drink, limit white flour/processed sugar; increase high protein, high fiber carbs, good breakfast, working toward 150 mins cardio per week, resistance training 2x/week.    The patient is here for a health maintenance visit.  Currently, the patient consumes a healthy diet and has an adequate exercise regimen. Screening lab work is ordered.  Immunizations are reported as current.  Advice and education is given regarding nutrition, aerobic exercise, routine dental evaluations, routine eye exams, reproductive health, cardiovascular risk reduction, sunscreen use, self skin examination (annual dermatology evaluations) and seat belt use (general overall safety).  Further recommendations after lab evaluation.  Annual wellness evaluations recommended.     I discussed the patients findings and my recommendations with patient.  I will contact patient regarding test results and provide instructions regarding any necessary changes in plan of care.    Patient voiced understanding of all instructions and denied further questions.    FOLLOW-UP  Return in about 3 months (around 2/17/2020), or  if symptoms worsen or fail to improve, for Recheck fu birth control.    Electronically signed by:    Amparo Basnal, ANGELINA  11/17/2019

## 2019-11-18 ENCOUNTER — TELEPHONE (OUTPATIENT)
Dept: INTERNAL MEDICINE | Facility: CLINIC | Age: 34
End: 2019-11-18

## 2019-11-18 LAB — 25(OH)D3 SERPL-MCNC: 32.1 NG/ML (ref 30–100)

## 2019-12-02 ENCOUNTER — TELEPHONE (OUTPATIENT)
Dept: INTERNAL MEDICINE | Facility: CLINIC | Age: 34
End: 2019-12-02

## 2019-12-02 NOTE — TELEPHONE ENCOUNTER
Patient called in asking for a cheaper B.C  The current B.C is over $100  Would like something cheaper.

## 2019-12-18 PROBLEM — Z01.419 WELL WOMAN EXAM: Status: ACTIVE | Noted: 2019-12-18

## 2019-12-19 ENCOUNTER — OFFICE VISIT (OUTPATIENT)
Dept: OBSTETRICS AND GYNECOLOGY | Facility: CLINIC | Age: 34
End: 2019-12-19

## 2019-12-19 VITALS
BODY MASS INDEX: 21.82 KG/M2 | DIASTOLIC BLOOD PRESSURE: 78 MMHG | HEIGHT: 68 IN | SYSTOLIC BLOOD PRESSURE: 112 MMHG | WEIGHT: 144 LBS

## 2019-12-19 DIAGNOSIS — Z71.1 WORRIED WELL: Primary | ICD-10-CM

## 2019-12-19 DIAGNOSIS — Z30.09 GENERAL COUNSELING FOR PRESCRIPTION OF ORAL CONTRACEPTIVES: ICD-10-CM

## 2019-12-19 PROCEDURE — 99203 OFFICE O/P NEW LOW 30 MIN: CPT | Performed by: OBSTETRICS & GYNECOLOGY

## 2019-12-19 RX ORDER — NORETHINDRONE ACETATE AND ETHINYL ESTRADIOL 1MG-20(21)
1 KIT ORAL DAILY
Qty: 28 TABLET | Refills: 12 | Status: SHIPPED | OUTPATIENT
Start: 2019-12-19 | End: 2020-02-13

## 2019-12-19 NOTE — PROGRESS NOTES
Subjective   Chief Complaint   Patient presents with   • Establish Care     New pt. pt referred for abnormal appearance of cervix. pt had pap 19 and it was negative (IN [facility])     Estefany Jesus is a 34 y.o. year old .  Patient's last menstrual period was 2019.  She presents to be seen because of concern from her primary care provider for abnormally appearing area on cervix.  Patient reports she been told in the past that she had a nabothian cyst.  However she reports feeling a bump herself and that provider thought that is what it was but also saw another area of concern.  She did have a normal Pap co-test this past month.  Patient reports she is taking Junel iron in the past and overall was doing well with bleeding but reports it made her mood different.  She reports the low Loestrin that the prior provider provided was not covered with her insurance.  She desires to talk about other oral contraceptive medication options.  Patient also has concerns about midcycle cramping and pressure.  She has concerns and questions about PID as well given she had a history of chlamydia in the past.    OTHER THINGS SHE WANTS TO DISCUSS TODAY:  Nothing else    The following portions of the patient's history were reviewed and updated as appropriate:current medications, allergies, past family history, past medical history, past social history and past surgical history    Social History    Tobacco Use      Smoking status: Former Smoker        Types: Electronic Cigarette        Quit date: 10/27/2019        Years since quittin.1      Smokeless tobacco: Never Used      Tobacco comment: started the gum    Review of Systems  Constitutional POS: nothing reported    NEG: anorexia or night sweats   Genitourinary POS: nothing reported    NEG: dysuria or hematuria   Gastointestinal POS: nothing reported    NEG: bloating, change in bowel habits, melena or reflux symptoms   Integument POS: nothing reported    NEG: moles  "that are changing in size, shape, color or rashes   Breast POS: nothing reported    NEG: persistent breast lump, skin dimpling or nipple discharge         Objective   /78   Ht 172.7 cm (68\")   Wt 65.3 kg (144 lb)   LMP 12/02/2019   Breastfeeding No   BMI 21.90 kg/m²     General:  well developed; well nourished  no acute distress                           Pelvis: Clinical staff was present for exam  External genitalia:  normal appearance of the external genitalia including Bartholin's and [place]'s glands.  :  urethral meatus normal;  Vaginal:  normal pink mucosa without prolapse or lesions.  Cervix:  normal appearance. Nabothian cyst(s) present with inclusion cysts. No abnormal appearing areas seen.   Uterus:  normal size, shape and consistency. anteverted;  Adnexa:  normal bimanual exam of the adnexa.  Rectal:  digital rectal exam not performed; anus visually normal appearing.     Lab Review   PAP - normal  NEG STD testing     Imaging   No data reviewed        Assessment   1. Normal appearing cervix with nabothian cysts   2. Contraceptive counseling      Plan   1. Reviewed normal pelvic exam with patient.  Reviewed with her in regards to pelvic inflammatory disease most patients will have severe pelvic pain or abdominal pain.  Reviewed with her physical findings do not indicate that at all today.  She was instructed how to start her birth control.  It can be started at any time.  Because it may take 1 month to become effective, the use of alternative contraception for one month was stressed.  The potential for breakthrough bleeding for up to 4 cycles was also emphasized.  2. The importance of keeping all planned follow-up and taking all medications as prescribed was emphasized.  3. Follow up for recheck of new COCP in 3-4 months    New Medications Ordered This Visit   Medications   • norethindrone-ethinyl estradiol FE (JUNEL FE 1/20) 1-20 MG-MCG per tablet     Sig: Take 1 tablet by mouth Daily.     " Dispense:  28 tablet     Refill:  12          This note was electronically signed.    Gricel Luna MD  December 19, 2019    Note: Speech recognition transcription software may have been used to create portions of this document.  An attempt at proofreading has been made but errors in transcription could still be present.

## 2020-02-13 ENCOUNTER — OFFICE VISIT (OUTPATIENT)
Dept: INTERNAL MEDICINE | Facility: CLINIC | Age: 35
End: 2020-02-13

## 2020-02-13 VITALS
DIASTOLIC BLOOD PRESSURE: 70 MMHG | BODY MASS INDEX: 21.82 KG/M2 | WEIGHT: 144 LBS | SYSTOLIC BLOOD PRESSURE: 110 MMHG | HEIGHT: 68 IN | OXYGEN SATURATION: 99 % | HEART RATE: 60 BPM | TEMPERATURE: 98.6 F

## 2020-02-13 DIAGNOSIS — Z91.09 ENVIRONMENTAL ALLERGIES: Primary | ICD-10-CM

## 2020-02-13 PROCEDURE — 99213 OFFICE O/P EST LOW 20 MIN: CPT | Performed by: NURSE PRACTITIONER

## 2020-02-13 NOTE — PROGRESS NOTES
Chief Complaint   Patient presents with   • URI     dry cough and earache       History of Present Illness  34 y.o.female presents for URI  The patient describes about 2 weeks of runny nose and congestion with dry cough.  It seems to be not improving with home care.  The patient reports associated sinus drainage and scratchy throat.  The patient is experiencing an intermittent dry nonproductive cough.  Positive earache fullness bilateral. There is no fever, chills or acute dyspnea.  Has not tried anything at home for symptoms.  Sx started after went to change filters in home and none noted; had someone come in and clean system and has had above sx since.    Review of Systems   Constitutional: Negative for chills and fever.   HENT: Positive for congestion, postnasal drip, rhinorrhea and sore throat. Negative for ear pain, sinus pressure and sneezing.    Respiratory: Positive for cough. Negative for shortness of breath.    Musculoskeletal: Negative for myalgias.   Neurological: Negative for headache.         [facility]  The following portions of the patient's history were reviewed and updated as appropriate: allergies, current medications, past family history, past medical history, past social history, past surgical history and problem list.     Past Medical History:   Diagnosis Date   • Abnormal Pap smear of cervix    • Depression    • Hypothyroid    • Pancreatitis       History reviewed. No pertinent surgical history.   No Known Allergies   Social History     Socioeconomic History   • Marital status: Unknown     Spouse name: Not on file   • Number of children: Not on file   • Years of education: Not on file   • Highest education level: Not on file   Tobacco Use   • Smoking status: Former Smoker     Types: Electronic Cigarette     Last attempt to quit: 10/27/2019     Years since quittin.2   • Smokeless tobacco: Never Used   • Tobacco comment: started the gum   Substance and Sexual Activity   • Alcohol use: Yes      "Comment: socially   • Drug use: No   • Sexual activity: Yes     Partners: Male     Birth control/protection: None     Family History   Problem Relation Age of Onset   • Diabetes Mother    • Hyperlipidemia Mother    • Diabetes Father    • Hyperlipidemia Father    • Hypertension Father    • COPD Maternal Grandmother    • COPD Maternal Grandfather    • Colon cancer Maternal Grandfather    • COPD Paternal Grandmother    • Breast cancer Paternal Grandmother    • COPD Paternal Grandfather    • Ovarian cancer Neg Hx    • Uterine cancer Neg Hx    • Osteoporosis Neg Hx             Current Outpatient Medications:   •  Biotin 800 MCG tablet, one tablet daily, Disp: , Rfl:   •  ibuprofen (ADVIL) 200 MG tablet, Take 1 tablet by mouth Every 4 (Four) Hours., Disp: , Rfl:   •  Multiple Vitamins-Minerals (MULTIVITAMIN ADULT EXTRA C PO), Take 1 tablet by mouth., Disp: , Rfl:     VITALS:  /70   Pulse 60   Temp 98.6 °F (37 °C)   Ht 172.7 cm (68\")   Wt 65.3 kg (144 lb)   SpO2 99%   BMI 21.90 kg/m²     Physical Exam   Constitutional: She is oriented to person, place, and time. She appears well-developed and well-nourished. No distress.   HENT:   Head: Normocephalic.   Right Ear: Tympanic membrane, external ear and ear canal normal. Tympanic membrane is not erythematous.   Left Ear: Tympanic membrane, external ear and ear canal normal. Tympanic membrane is not erythematous.   Nose: Mucosal edema and rhinorrhea present. No congestion. Right sinus exhibits no maxillary sinus tenderness and no frontal sinus tenderness. Left sinus exhibits no maxillary sinus tenderness and no frontal sinus tenderness.   Mouth/Throat: Mucous membranes are normal. Posterior oropharyngeal erythema present. No oropharyngeal exudate, posterior oropharyngeal edema or tonsillar abscesses. No tonsillar exudate.   Posterior cobblestoning; boggy nasal turbinates bilateral   Eyes: Conjunctivae are normal. Right eye exhibits no discharge. Left eye exhibits no " discharge.   Neck: Normal range of motion. Neck supple.   Cardiovascular: Normal rate, regular rhythm and normal heart sounds.   Pulmonary/Chest: Effort normal and breath sounds normal. No respiratory distress. She has no wheezes. She has no rhonchi. She has no rales.   Lymphadenopathy:        Head (right side): No submental, no submandibular and no tonsillar adenopathy present.        Head (left side): No submental, no submandibular and no tonsillar adenopathy present.     She has no cervical adenopathy.   Neurological: She is alert and oriented to person, place, and time.   Skin: Skin is warm and dry.   Vitals reviewed.      LABS  No new labs    ASSESSMENT/PLAN  Estefany was seen today for uri.    Diagnoses and all orders for this visit:    Environmental allergies    likely environmental allergies with sx lasting 2 weeks and does not look like sinusitis.  Recommend oral antihistamine and nasal steroid spray. Reviewed OTC choices. Pt prefers to do holistic treatment.  She is to notify me if no improvement or fever of if rhinorrhea/cough becomes purulent.  Today I have spent a total of 15 minutes face to face with Estefany Jesus.  During this time, a total of 9 minutes was spent counseling on the nature of the diagnosis including risks and benefits of treatment, complications, implications, management, safe and proper use of medications. Online resources reviewed for OTC meds and holistic options for treatment of allergy symptoms.  Patient education is provided today concerning related diagnosis.      I discussed the patients findings and my recommendations with patient.  Patient was encouraged to keep me informed of any acute changes, lack of improvement, or any new concerning symptoms.  Patient voiced understanding of all instructions and denied further questions.      FOLLOW-UP  Return if symptoms worsen or fail to improve.    Electronically signed by:    ANGELINA Dorsey  02/13/2020    EMR  Dragon/Transcription Disclaimer:  Much of this encounter note is an electronic transcription/translation of spoken language to printed text.  The electronic translation of spoken language may permit erroneous, or at times, nonsensical words or phrases to be inadvertently transcribed.  Although I have reviewed the note for such errors, some may still exist

## 2020-04-14 ENCOUNTER — TELEPHONE (OUTPATIENT)
Dept: INTERNAL MEDICINE | Facility: CLINIC | Age: 35
End: 2020-04-14

## 2020-04-14 NOTE — TELEPHONE ENCOUNTER
PT CALLED STATING THAT SHE DRANK A LOT OF ALCOHOL ON 4/4/20 ALL DAY. PT STATED THAT SHE IS EXPERIENCING BLOOD SHOT EYES, EYE PAIN, TENDER TO TOUCH, BLURRED VISION, SENSITIVE TO LIGHT AND HEADACHES. PT STATED THAT SHE DID GET DEHYDRATED.     PT CONTACT [phone]     PLEASE ADVISE

## 2020-04-14 NOTE — TELEPHONE ENCOUNTER
I returned call to pt.  She is c/o headache behind eyes with eye redness, sensitive to light. Onset about a week. Doesn't know if r/t heavily drinking alcohol prior to but has not went away despite drinking plenty water. Has not tried anything else other than hydration. Recommended excedrin migraine OTC and pataday OTC eye drops to help with symptoms. Cont to drink plenty water. If not improved can schedule a video telemedicine visit for further eval.

## 2021-06-23 ENCOUNTER — TELEPHONE (OUTPATIENT)
Dept: INTERNAL MEDICINE | Facility: CLINIC | Age: 36
End: 2021-06-23

## 2021-06-23 NOTE — TELEPHONE ENCOUNTER
Caller: Estefany Jesus    Relationship to patient: Self    Best call back number: [phone]    Chief complaint: PHYSICAL WITH PAP AND BLOOD WORK, SEVERE UPPER ABDOMINAL PAIN AND RADIATES TO THE BACK WORSE WHEN EATING    Type of visit: PHYSICAL WITH PAP    Requested date:     If rescheduling, when is the original appointment:     Additional notes: PATIENT IS SCHEDULED TO SEE ANOTHER PROVIDER OTHER THAN HER PCP; MAT VELAZQUEZ ON 6/28/21 AT 7:45.

## 2021-06-26 ENCOUNTER — OFFICE VISIT (OUTPATIENT)
Dept: INTERNAL MEDICINE | Facility: CLINIC | Age: 36
End: 2021-06-26

## 2021-06-26 VITALS
WEIGHT: 157.8 LBS | HEART RATE: 70 BPM | BODY MASS INDEX: 23.92 KG/M2 | HEIGHT: 68 IN | TEMPERATURE: 98.9 F | SYSTOLIC BLOOD PRESSURE: 118 MMHG | DIASTOLIC BLOOD PRESSURE: 74 MMHG | OXYGEN SATURATION: 100 %

## 2021-06-26 DIAGNOSIS — R10.13 EPIGASTRIC PAIN: ICD-10-CM

## 2021-06-26 DIAGNOSIS — Z01.419 WELL WOMAN EXAM WITH ROUTINE GYNECOLOGICAL EXAM: ICD-10-CM

## 2021-06-26 DIAGNOSIS — Z00.00 ANNUAL PHYSICAL EXAM: Primary | ICD-10-CM

## 2021-06-26 PROCEDURE — 99395 PREV VISIT EST AGE 18-39: CPT | Performed by: NURSE PRACTITIONER

## 2021-06-26 NOTE — PATIENT INSTRUCTIONS
Generic nexium or prilosec over the counter.  Take 1 tab by mouth first thing in the morning on empty stomach and eat 30 minutes after med.

## 2021-06-26 NOTE — PROGRESS NOTES
Chief Complaint   Patient presents with   • Annual Exam     pap   • Abdominal Pain     Monday, usually day after drinking a lot, high abdominal area to back       History of Present Illness    35 y.o.female presents for annual physical exam and well woman exam.  General health overall good.  Denies any vision dental or hearing difficulties.  Plan to reviewed vaccines and update today if needed.  No chronic medications other than supplements.  Does complain of epigastric pain.  Worse with eating.  Also thinks is made worse by her binge drinking.  No melena or hematochezia.  No nausea or vomiting.  Functional panel.  Former smoker.  She does drink alcohol but describes as occasional binge drinking about once per month.  Reproductive health menses normal.  No pelvic pain or vaginal pain.  Does have some heavy to clear vaginal discharge but this is normal for her. no difficulty with urination.  No breast complaints.    Review of Systems   Constitutional: Negative for chills and fatigue.   HENT: Negative for congestion, postnasal drip and rhinorrhea.    Eyes: Negative for blurred vision and visual disturbance.   Respiratory: Negative for cough and shortness of breath.    Cardiovascular: Negative for chest pain, palpitations and leg swelling.   Gastrointestinal: Positive for abdominal pain. Negative for blood in stool, constipation, diarrhea, nausea, vomiting, GERD and indigestion.   Genitourinary: Positive for vaginal discharge. Negative for difficulty urinating, vaginal bleeding and vaginal pain.   Musculoskeletal: Negative for arthralgias.   Skin: Negative for rash.   Neurological: Negative for dizziness and headache.   Psychiatric/Behavioral: Negative for depressed mood. The patient is not nervous/anxious.        [facility]  The following portions of the patient's history were reviewed and updated as appropriate: allergies, current medications, past family history, past medical history, past social history, past surgical  "history and problem list.     Past Medical History:   Diagnosis Date   • Abnormal Pap smear of cervix    • Depression    • Hypothyroid    • Pancreatitis       No Known Allergies   Social History     Tobacco Use   • Smoking status: Former Smoker     Types: Electronic Cigarette     Quit date: 10/27/2019     Years since quittin.6   • Smokeless tobacco: Never Used   • Tobacco comment: started the gum   Vaping Use   • Vaping Use: Former   • Substances: Nicotine, Flavoring   • Devices: Disposable   Substance Use Topics   • Alcohol use: Yes     Alcohol/week: 12.0 standard drinks     Types: 12 Cans of beer per week     Comment: binge   • Drug use: No     History reviewed. No pertinent surgical history.   Family History   Problem Relation Age of Onset   • Diabetes Mother    • Hyperlipidemia Mother    • Diabetes Father    • Hyperlipidemia Father    • Hypertension Father    • COPD Maternal Grandmother    • COPD Maternal Grandfather    • Colon cancer Maternal Grandfather    • COPD Paternal Grandmother    • Breast cancer Paternal Grandmother    • COPD Paternal Grandfather    • Ovarian cancer Neg Hx    • Uterine cancer Neg Hx    • Osteoporosis Neg Hx            Current Outpatient Medications:   •  ibuprofen (ADVIL) 200 MG tablet, Take 1 tablet by mouth Every 4 (Four) Hours., Disp: , Rfl:   •  Multiple Vitamins-Minerals (MULTIVITAMIN ADULT EXTRA C PO), Take 1 tablet by mouth., Disp: , Rfl:   •  Turmeric (QC TUMERIC COMPLEX PO), Take  by mouth., Disp: , Rfl:   •  Biotin 800 MCG tablet, one tablet daily, Disp: , Rfl:     VITALS:  /74   Pulse 70   Temp 98.9 °F (37.2 °C)   Ht 172.7 cm (68\")   Wt 71.6 kg (157 lb 12.8 oz)   LMP 2021   SpO2 100%   Breastfeeding No   BMI 23.99 kg/m²     Physical Exam  Vitals reviewed. Exam conducted with a chaperone present.   Constitutional:       General: She is not in acute distress.     Appearance: She is well-developed. She is not ill-appearing or diaphoretic.   HENT:      " Head: Normocephalic.      Right Ear: Tympanic membrane, ear canal and external ear normal.      Left Ear: Tympanic membrane, ear canal and external ear normal.      Nose: Nose normal.      Mouth/Throat:      Mouth: Mucous membranes are moist.      Pharynx: Oropharynx is clear. No oropharyngeal exudate or posterior oropharyngeal erythema.   Eyes:      General: Lids are normal.         Right eye: No discharge.         Left eye: No discharge.      Extraocular Movements: Extraocular movements intact.      Conjunctiva/sclera: Conjunctivae normal.      Pupils: Pupils are equal, round, and reactive to light.   Neck:      Thyroid: No thyromegaly.      Vascular: No JVD.   Cardiovascular:      Rate and Rhythm: Normal rate and regular rhythm.      Pulses: Normal pulses.      Heart sounds: Normal heart sounds.      Comments: No edema  Pulmonary:      Effort: Pulmonary effort is normal. No respiratory distress.      Breath sounds: Normal breath sounds.   Abdominal:      General: Bowel sounds are normal. There is no distension or abdominal bruit.      Palpations: Abdomen is soft. There is no hepatomegaly, splenomegaly or mass.      Tenderness: There is abdominal tenderness in the epigastric area. There is no right CVA tenderness, left CVA tenderness, guarding or rebound. Negative signs include Carrera's sign.      Hernia: No hernia is present.   Genitourinary:     General: Normal vulva.      Exam position: Lithotomy position.      Labia:         Right: No rash, tenderness or lesion.         Left: No rash, tenderness or lesion.       Vagina: No vaginal discharge, erythema or tenderness.      Cervix: Lesion present. No discharge or erythema.      Uterus: Normal.       Adnexa: Right adnexa normal.        Right: No mass, tenderness or fullness.          Left: No mass, tenderness or fullness.           Musculoskeletal:         General: Normal range of motion.      Cervical back: Normal range of motion and neck supple.      Comments:  All major joints with normal ROM   Lymphadenopathy:      Head:      Right side of head: No submental, submandibular or tonsillar adenopathy.      Left side of head: No submental, submandibular or tonsillar adenopathy.      Cervical: No cervical adenopathy.   Skin:     General: Skin is warm and dry.      Capillary Refill: Capillary refill takes less than 2 seconds.      Findings: No rash.   Neurological:      General: No focal deficit present.      Mental Status: She is alert and oriented to person, place, and time.      Cranial Nerves: No cranial nerve deficit.      Coordination: Coordination normal.      Gait: Gait normal.   Psychiatric:         Mood and Affect: Mood normal.         Speech: Speech normal.         Behavior: Behavior normal.         Result Review  pending    Assessment and Plan    Diagnoses and all orders for this visit:    1. Annual physical exam (Primary)  -     CBC & Differential; Future  -     Comprehensive Metabolic Panel; Future  -     Lipid Panel; Future  -     Lipase; Future    2. Well woman exam with routine gynecological exam  -     Liquid-based Pap Smear, Screening; Future    3. Epigastric pain  Comments:  starte nexium or prilosec 20mg daily OTC. avoid nsaids and alcohol    pt states is private pay so will get ppi otc.  I did discuss with pt risk of PUD r/t nsaids and alcohol use. If no improvement with epigastric pain after starting PPI she is to let me know and will refer to GI.    I discussed the patients findings and my recommendations with patient.  Patient was encouraged to keep me informed of any acute changes, lack of improvement, or any new concerning symptoms.  Patient voiced understanding of all instructions and denied further questions.      Follow Up   No follow-ups on file.      Electronically signed by:    ANGELINA Dorsey  06/26/2021

## 2021-06-28 ENCOUNTER — LAB (OUTPATIENT)
Dept: LAB | Facility: HOSPITAL | Age: 36
End: 2021-06-28

## 2021-06-28 DIAGNOSIS — Z00.00 ANNUAL PHYSICAL EXAM: ICD-10-CM

## 2021-06-28 LAB
BASOPHILS # BLD AUTO: 0.05 10*3/MM3 (ref 0–0.2)
BASOPHILS NFR BLD AUTO: 0.7 % (ref 0–1.5)
DEPRECATED RDW RBC AUTO: 40.6 FL (ref 37–54)
EOSINOPHIL # BLD AUTO: 0.1 10*3/MM3 (ref 0–0.4)
EOSINOPHIL NFR BLD AUTO: 1.4 % (ref 0.3–6.2)
ERYTHROCYTE [DISTWIDTH] IN BLOOD BY AUTOMATED COUNT: 12.1 % (ref 12.3–15.4)
HCT VFR BLD AUTO: 42.3 % (ref 34–46.6)
HGB BLD-MCNC: 14.3 G/DL (ref 12–15.9)
IMM GRANULOCYTES # BLD AUTO: 0.02 10*3/MM3 (ref 0–0.05)
IMM GRANULOCYTES NFR BLD AUTO: 0.3 % (ref 0–0.5)
LYMPHOCYTES # BLD AUTO: 1.74 10*3/MM3 (ref 0.7–3.1)
LYMPHOCYTES NFR BLD AUTO: 24.4 % (ref 19.6–45.3)
MCH RBC QN AUTO: 31 PG (ref 26.6–33)
MCHC RBC AUTO-ENTMCNC: 33.8 G/DL (ref 31.5–35.7)
MCV RBC AUTO: 91.8 FL (ref 79–97)
MONOCYTES # BLD AUTO: 0.44 10*3/MM3 (ref 0.1–0.9)
MONOCYTES NFR BLD AUTO: 6.2 % (ref 5–12)
NEUTROPHILS NFR BLD AUTO: 4.78 10*3/MM3 (ref 1.7–7)
NEUTROPHILS NFR BLD AUTO: 67 % (ref 42.7–76)
NRBC BLD AUTO-RTO: 0 /100 WBC (ref 0–0.2)
PLATELET # BLD AUTO: 248 10*3/MM3 (ref 140–450)
PMV BLD AUTO: 11.5 FL (ref 6–12)
RBC # BLD AUTO: 4.61 10*6/MM3 (ref 3.77–5.28)
WBC # BLD AUTO: 7.13 10*3/MM3 (ref 3.4–10.8)

## 2021-06-28 PROCEDURE — 83690 ASSAY OF LIPASE: CPT

## 2021-06-28 PROCEDURE — 80053 COMPREHEN METABOLIC PANEL: CPT

## 2021-06-28 PROCEDURE — 80061 LIPID PANEL: CPT

## 2021-06-28 PROCEDURE — 85025 COMPLETE CBC W/AUTO DIFF WBC: CPT

## 2021-06-29 ENCOUNTER — TELEPHONE (OUTPATIENT)
Dept: INTERNAL MEDICINE | Facility: CLINIC | Age: 36
End: 2021-06-29

## 2021-06-29 LAB
ALBUMIN SERPL-MCNC: 5 G/DL (ref 3.5–5.2)
ALBUMIN/GLOB SERPL: 1.9 G/DL
ALP SERPL-CCNC: 76 U/L (ref 39–117)
ALT SERPL W P-5'-P-CCNC: 60 U/L (ref 1–33)
ANION GAP SERPL CALCULATED.3IONS-SCNC: 12.3 MMOL/L (ref 5–15)
AST SERPL-CCNC: 23 U/L (ref 1–32)
BILIRUB SERPL-MCNC: 0.6 MG/DL (ref 0–1.2)
BUN SERPL-MCNC: 10 MG/DL (ref 6–20)
BUN/CREAT SERPL: 11.6 (ref 7–25)
CALCIUM SPEC-SCNC: 9.2 MG/DL (ref 8.6–10.5)
CHLORIDE SERPL-SCNC: 100 MMOL/L (ref 98–107)
CHOLEST SERPL-MCNC: 150 MG/DL (ref 0–200)
CO2 SERPL-SCNC: 23.7 MMOL/L (ref 22–29)
CREAT SERPL-MCNC: 0.86 MG/DL (ref 0.57–1)
GFR SERPL CREATININE-BSD FRML MDRD: 75 ML/MIN/1.73
GLOBULIN UR ELPH-MCNC: 2.6 GM/DL
GLUCOSE SERPL-MCNC: 78 MG/DL (ref 65–99)
HDLC SERPL-MCNC: 63 MG/DL (ref 40–60)
LDLC SERPL CALC-MCNC: 76 MG/DL (ref 0–100)
LDLC/HDLC SERPL: 1.21 {RATIO}
LIPASE SERPL-CCNC: 28 U/L (ref 13–60)
POTASSIUM SERPL-SCNC: 4.3 MMOL/L (ref 3.5–5.2)
PROT SERPL-MCNC: 7.6 G/DL (ref 6–8.5)
SODIUM SERPL-SCNC: 136 MMOL/L (ref 136–145)
TRIGL SERPL-MCNC: 54 MG/DL (ref 0–150)
VLDLC SERPL-MCNC: 11 MG/DL (ref 5–40)

## 2021-06-29 NOTE — TELEPHONE ENCOUNTER
Caller: Estefany Jesus    Relationship: Self    Best call back number: [phone]    What is the best time to reach you: ANYTIME     Who are you requesting to speak with (clinical staff, provider,  specific staff member): CLINICAL STAFF     What was the call regarding: PATIENT IS CALLING ABOUT THE MOST RECENT LAB WORK THAT SHE HAS HAD DONE. SHE HAS BEEN ABLE TO SEE THE RESULTS ON MYCHART BUT SHE HAS SOME QUESTIONS ABOUT THEM.    SHE ALSO SAYS THAT SHE HAS BEEN EXPERIENCING A PAIN ON THE INSIDE OF HER ELBOW AND DOWN TO HER WRIST. SHE SAYS THAT WHILE SHE WAS ON THE TREADMILL SHE STARTED TO BECOME DIZZY AND WEAK.   Do you require a callback: YES

## 2021-06-29 NOTE — TELEPHONE ENCOUNTER
Spoke w/ pt, stated while exercising on treadmill today her arm got a little tingly/numb, but has had a little pressure since getting labs done at last visit. Stated go a little dizzy as well, but no other sx's, and after sitting felt better. Let pt know that if any sx's get worse, or any N&V, SOB or chest pain/pressure needs to go to ER. Pt states she would if needed, or if current sx's don't start getting better in couple days will make appt.

## 2021-08-31 ENCOUNTER — TELEPHONE (OUTPATIENT)
Dept: OBSTETRICS AND GYNECOLOGY | Facility: CLINIC | Age: 36
End: 2021-08-31

## 2021-09-29 ENCOUNTER — OFFICE VISIT (OUTPATIENT)
Dept: OBSTETRICS AND GYNECOLOGY | Facility: CLINIC | Age: 36
End: 2021-09-29

## 2021-09-29 VITALS
BODY MASS INDEX: 23.87 KG/M2 | RESPIRATION RATE: 14 BRPM | SYSTOLIC BLOOD PRESSURE: 122 MMHG | DIASTOLIC BLOOD PRESSURE: 72 MMHG | WEIGHT: 157 LBS

## 2021-09-29 DIAGNOSIS — R10.30 LOWER ABDOMINAL PAIN: Primary | ICD-10-CM

## 2021-09-29 PROCEDURE — 99213 OFFICE O/P EST LOW 20 MIN: CPT | Performed by: OBSTETRICS & GYNECOLOGY

## 2021-09-29 PROCEDURE — 81003 URINALYSIS AUTO W/O SCOPE: CPT | Performed by: OBSTETRICS & GYNECOLOGY

## 2021-09-29 NOTE — PROGRESS NOTES
Subjective   Chief Complaint   Patient presents with   • Pelvic Pain     2Weeks     Estefany Jesus is a 36 y.o. year old .  Patient's last menstrual period was 2021 (approximate).  She presents to be seen because of pelvic pain. Patient reports mid-lower abdominal pain for the last 3-4 months. She describes as a constant dull pain with associated fullness. Alleviating factors: no identifiable factors. Aggravating factors: no identifiable factors. Last bowel movement: 2 days ago. Reports normally has bowel movement every day with normal consistency. She denies dysuria. She is tolerating regular diet. She denies nausea or vomiting. Menses occur once a month lasting 6-7 days with normal flow. She reports her menses are quite painful but improved with use of OTC Ibuprofen. Last pap smear: 2021 -- normal. She reports history of Chlamydia as a teenager. She does report a history of alcohol abuse. She was experiencing epigastric pain a couple of months ago. Labs performed revealed normal Lipase however elevated LFTs. She reports she stopped drinking 5 weeks ago and that pain has resolved. She is not currently sexually active.     OTHER THINGS SHE WANTS TO DISCUSS TODAY:  Nothing else    The following portions of the patient's history were reviewed and updated as appropriate:current medications, allergies, past family history, past medical history, past social history and past surgical history       Social History    Tobacco Use      Smoking status: Former Smoker        Types: Electronic Cigarette        Quit date: 10/27/2019        Years since quittin.9      Smokeless tobacco: Never Used      Tobacco comment: started the gum    Review of Systems  Constitutional POS: nothing reported    NEG: anorexia or night sweats   Genitourinary POS: nothing reported    NEG: dysuria or hematuria   Gastointestinal POS: nothing reported    NEG: bloating, change in bowel habits, melena or reflux symptoms   Integument  POS: nothing reported    NEG: moles that are changing in size, shape, color or rashes   Breast POS: nothing reported    NEG: persistent breast lump, skin dimpling or nipple discharge         Objective   /72   Resp 14   Wt 71.2 kg (157 lb)   LMP 08/29/2021 (Approximate)   Breastfeeding No   BMI 23.87 kg/m²     General:  well developed; well nourished  no acute distress   Skin:  Not performed.   Thyroid: not examined   Lungs:  breathing is unlabored   Heart:  Not performed.   Breasts:  Not performed.   Abdomen: soft, non-tender; no masses  no umbilical or inguinal hernias are present  no hepato-splenomegaly   Pelvis: Clinical staff was present for exam  External genitalia:  normal appearance of the external genitalia including Bartholin's and [place]'s glands.  :  urethral meatus normal;  Vaginal:  normal pink mucosa without prolapse or lesions.  Cervix:  normal appearance. Nabothian cyst(s) present   Uterus:  normal size, shape and consistency. retroverted;  Adnexa:  normal bimanual exam of the adnexa.     Lab Review   UPT    Imaging   No data reviewed        Assessment   1. Lower Abdominal Pain  2. Dysmenorrhea     Plan   UPT negative. Benign abdominal and pelvic exam. Counseled patient to begin keep pain journal to help better ascertain etiology of pain.  The following tests were ordered today: STD swabs for GC, chlamydia and trichimoniasis and UA with culture if indicated.  It was explained to Estefany that all lab test should be back within the one week after they are performed. She will be notified about the results, regardless of the findings. If she has not been contacted by the office within 2 weeks after the test has been performed, it is her responsibility to contact us to learn about her results.  1. Ultrasound needs to be done any time that is convenient for her.   2. Discussed my departure from [facility]. Patient has been a patient of Dr. Luna so will arrange follow up with her.  3. The  importance of keeping all planned follow-up and taking all medications as prescribed was emphasized.  4. Follow up after ultrasound with Dr. Luna    No orders of the defined types were placed in this encounter.         This note was electronically signed.    Amy Cain,   September 29, 2021    Note: Speech recognition transcription software may have been used to create portions of this document.  An attempt at proofreading has been made but errors in transcription could still be present.

## 2021-09-30 ENCOUNTER — TELEPHONE (OUTPATIENT)
Dept: OBSTETRICS AND GYNECOLOGY | Facility: CLINIC | Age: 36
End: 2021-09-30

## 2021-09-30 LAB
BILIRUB UR QL STRIP: NEGATIVE
CLARITY UR: CLEAR
COLOR UR: YELLOW
GLUCOSE UR STRIP-MCNC: NEGATIVE MG/DL
HGB UR QL STRIP.AUTO: NEGATIVE
KETONES UR QL STRIP: NEGATIVE
LEUKOCYTE ESTERASE UR QL STRIP.AUTO: NEGATIVE
NITRITE UR QL STRIP: NEGATIVE
PH UR STRIP.AUTO: 8.5 [PH] (ref 5–8)
PROT UR QL STRIP: NEGATIVE
SP GR UR STRIP: 1.01 (ref 1–1.03)
UROBILINOGEN UR QL STRIP: ABNORMAL

## 2021-09-30 NOTE — TELEPHONE ENCOUNTER
Has pain worsened since seeing Dr. Cain?  If so we can see about moving up.     Thanks, Gricel Luna MD

## 2021-09-30 NOTE — TELEPHONE ENCOUNTER
anita        Pt received lab results. Also wanted to know if she could moved u/s appt up sooner due to some discomfort? Appt is now 10/19

## 2021-10-01 ENCOUNTER — TELEPHONE (OUTPATIENT)
Dept: INTERNAL MEDICINE | Facility: CLINIC | Age: 36
End: 2021-10-01

## 2021-10-01 NOTE — TELEPHONE ENCOUNTER
PATIENT CALLED BACK IN. HER OB WAS ABLE TO GET HER IN THIS MORNING. CANCELLED THE APPOINTMENT FOR TODAY.

## 2021-10-01 NOTE — TELEPHONE ENCOUNTER
Caller: Estefany Jesus    Relationship: Self    Best call back number: [phone]    What orders are you requesting (i.e. lab or imaging): FULL LIVER PANEL, OVARIAN CANCER LABS     In what timeframe would the patient need to come in: PATIENT WOULD LIKE TO HAVE DONE TODAY, 10/01/21    Where will you receive your lab/imaging services: CLINIC    Additional notes: PATIENT STATED SHE IS STILL HAVING LIVER PAIN, LARGE AMOUNT OF ABDOMINAL PAIN AND PRESSURE.          PATIENT WAS SCHEDULED TO SEE DOCTOR SAVANA TOMORROW, SAT, BUT HUB SCHEDULED PATIENT TO SEE DOCTOR LEONORA TODAY @ 9:45 DUE TO SYMPTOMS.

## 2021-10-06 ENCOUNTER — OFFICE VISIT (OUTPATIENT)
Dept: OBSTETRICS AND GYNECOLOGY | Facility: CLINIC | Age: 36
End: 2021-10-06

## 2021-10-06 VITALS
BODY MASS INDEX: 23.49 KG/M2 | SYSTOLIC BLOOD PRESSURE: 138 MMHG | WEIGHT: 155 LBS | DIASTOLIC BLOOD PRESSURE: 78 MMHG | HEIGHT: 68 IN

## 2021-10-06 DIAGNOSIS — F10.20 ALCOHOLISM (HCC): ICD-10-CM

## 2021-10-06 DIAGNOSIS — R10.84 GENERALIZED ABDOMINAL PAIN: Primary | ICD-10-CM

## 2021-10-06 PROCEDURE — 99213 OFFICE O/P EST LOW 20 MIN: CPT | Performed by: OBSTETRICS & GYNECOLOGY

## 2021-10-06 NOTE — PROGRESS NOTES
"Subjective   Chief Complaint   Patient presents with   • Follow-up     US today     Estefany Jesus is a 36 y.o. year old  who comes to review her recent testing and discuss next steps regarding lower abdominal pain.  She saw Dr. Cain my partner on  due to my schedule being overbooked.    Reports lower abdominal cramping in the middle of cycle near ovulation   She reports history of really painful periods relieved with NSAIDs  A couple weeks ago she noticed some bloating with \"stomach pushing out\" and frequent urination and cramping did not stop  She reports this has continued and it has been hard to keep jeans on. Not extremely painful but has been annoying enough to bother her  She does admit to being an alcoholic. She has not drank in 6 weeks. She had some pain in RUQ area when she was drinking and reports she had elevated LFTs when seen by her PCP.    Currently the lower abdominal pressure and frequent urination with some urgency  She always feel like she needs to urinate  She does admit to frequent hydration   No alleviating factors for lower abdominal pressure  She has some mild nausea. Sometimes laying on side makes her uncomfortable. When sleeping she feels heaviness in her abdomen.     LMP was this past Saturday   She is not currently sexually active  She has menstrual cycles regular every 28 days  Duration about 3 days of heavy bleeding and then lighter after that - total days of about 6-7 days  No intermenstrual bleeding    She also reports a strong smell in her urine  Last BM was this morning   She has BMs usually daily. She does admit when on period she has some issues.     OTHER THINGS SHE WANTS TO DISCUSS TODAY:  Nothing else       Objective   /78   Ht 172.7 cm (68\")   Wt 70.3 kg (155 lb)   LMP 10/02/2021 (Exact Date)   Breastfeeding No   BMI 23.57 kg/m²     Lab Review   STD swabs for GC, chlamydia and trichimoniasis and UA   CMP    Imaging   Pelvic ultrasound report     "   Assessment   1. Lower abdominal pain with normal pelvic ultrasound and previously normal pelvic exam by my partner  2. Alcoholism      Plan   1. Reviewed with patient there does not seem to be a gynecological etiology for her lower abdominal pain.  Reviewed for dysmenorrhea pain we could always try hormonal contraception but she reports it is really the lower abdominal pressure that she feels in between her cycles.  I recommend that she try gluten-free diet for 1 to 2 weeks and then also see gastroenterology especially given her history of alcoholism.  Patient verbalizes understanding is willing to do this.  Reviewed if GI work-up is negative and she still having issues that we potentially consider hormonal contraception versus a diagnostic laparoscopy.  2. The importance of keeping all planned follow-up and taking all medications as prescribed was emphasized.  3. Follow up for recheck of in 3 months    No orders of the defined types were placed in this encounter.         Total time spent today with Estefany  was 20-29 minutes (level 3).  Greater than 50% of the time was spent face-to-face coordinating care, answering her questions and counseling regarding pathophysiology of her presenting problem along with plans for any diagnositc work-up and treatment.    This note was electronically signed.    Gricel Luna MD  October 6, 2021    Note: Speech recognition transcription software may have been used to create portions of this document.  An attempt at proofreading has been made but errors in transcription could still be present.

## 2022-03-15 ENCOUNTER — LAB (OUTPATIENT)
Dept: LAB | Facility: HOSPITAL | Age: 37
End: 2022-03-15

## 2022-03-15 ENCOUNTER — OFFICE VISIT (OUTPATIENT)
Dept: INTERNAL MEDICINE | Facility: CLINIC | Age: 37
End: 2022-03-15

## 2022-03-15 VITALS
WEIGHT: 150 LBS | OXYGEN SATURATION: 100 % | TEMPERATURE: 97.8 F | SYSTOLIC BLOOD PRESSURE: 120 MMHG | HEART RATE: 52 BPM | BODY MASS INDEX: 22.81 KG/M2 | DIASTOLIC BLOOD PRESSURE: 72 MMHG

## 2022-03-15 DIAGNOSIS — Z00.00 HEALTHCARE MAINTENANCE: ICD-10-CM

## 2022-03-15 DIAGNOSIS — R51.9 NONINTRACTABLE EPISODIC HEADACHE, UNSPECIFIED HEADACHE TYPE: ICD-10-CM

## 2022-03-15 DIAGNOSIS — F41.9 ANXIETY: Primary | ICD-10-CM

## 2022-03-15 LAB
25(OH)D3 SERPL-MCNC: 29.2 NG/ML (ref 30–100)
ALBUMIN SERPL-MCNC: 5.2 G/DL (ref 3.5–5.2)
ALBUMIN/GLOB SERPL: 1.9 G/DL
ALP SERPL-CCNC: 66 U/L (ref 39–117)
ALT SERPL W P-5'-P-CCNC: 30 U/L (ref 1–33)
ANION GAP SERPL CALCULATED.3IONS-SCNC: 11.7 MMOL/L (ref 5–15)
AST SERPL-CCNC: 24 U/L (ref 1–32)
BILIRUB SERPL-MCNC: 0.5 MG/DL (ref 0–1.2)
BUN SERPL-MCNC: 9 MG/DL (ref 6–20)
BUN/CREAT SERPL: 13.6 (ref 7–25)
CALCIUM SPEC-SCNC: 10.3 MG/DL (ref 8.6–10.5)
CHLORIDE SERPL-SCNC: 101 MMOL/L (ref 98–107)
CO2 SERPL-SCNC: 25.3 MMOL/L (ref 22–29)
CREAT SERPL-MCNC: 0.66 MG/DL (ref 0.57–1)
DEPRECATED RDW RBC AUTO: 39.6 FL (ref 37–54)
EGFRCR SERPLBLD CKD-EPI 2021: 116.8 ML/MIN/1.73
ERYTHROCYTE [DISTWIDTH] IN BLOOD BY AUTOMATED COUNT: 11.9 % (ref 12.3–15.4)
GLOBULIN UR ELPH-MCNC: 2.8 GM/DL
GLUCOSE SERPL-MCNC: 91 MG/DL (ref 65–99)
HCT VFR BLD AUTO: 42.8 % (ref 34–46.6)
HGB BLD-MCNC: 14.4 G/DL (ref 12–15.9)
MCH RBC QN AUTO: 30.6 PG (ref 26.6–33)
MCHC RBC AUTO-ENTMCNC: 33.6 G/DL (ref 31.5–35.7)
MCV RBC AUTO: 91.1 FL (ref 79–97)
PLATELET # BLD AUTO: 297 10*3/MM3 (ref 140–450)
PMV BLD AUTO: 10.5 FL (ref 6–12)
POTASSIUM SERPL-SCNC: 4.8 MMOL/L (ref 3.5–5.2)
PROT SERPL-MCNC: 8 G/DL (ref 6–8.5)
RBC # BLD AUTO: 4.7 10*6/MM3 (ref 3.77–5.28)
SODIUM SERPL-SCNC: 138 MMOL/L (ref 136–145)
WBC NRBC COR # BLD: 5.53 10*3/MM3 (ref 3.4–10.8)

## 2022-03-15 PROCEDURE — 85027 COMPLETE CBC AUTOMATED: CPT

## 2022-03-15 PROCEDURE — 36415 COLL VENOUS BLD VENIPUNCTURE: CPT

## 2022-03-15 PROCEDURE — 80053 COMPREHEN METABOLIC PANEL: CPT

## 2022-03-15 PROCEDURE — 82306 VITAMIN D 25 HYDROXY: CPT

## 2022-03-15 PROCEDURE — 99214 OFFICE O/P EST MOD 30 MIN: CPT | Performed by: NURSE PRACTITIONER

## 2022-03-15 RX ORDER — HYDROXYZINE PAMOATE 25 MG/1
25 CAPSULE ORAL 3 TIMES DAILY PRN
Qty: 90 CAPSULE | Refills: 1 | Status: SHIPPED | OUTPATIENT
Start: 2022-03-15

## 2022-03-15 NOTE — PROGRESS NOTES
Chief Complaint   Patient presents with   • Headache   • Anxiety       History of Present Illness    36 y.o.female presents for headache anxiety.    Anxiety onset few months. Went through a long term relationship termination in December. Also in full time school. Feels she may have had a panic attack in November.   Had a sinus infection in late January was treated through urgent care.  Still feels like occasionally has some fluid in ears and has been having an ongoing headache since that sinus issue.  Does have some intermittent dizziness.    Pt would like to update general health maintenance labs.    Review of Systems   Constitutional: Negative for chills and fever.   HENT: Positive for congestion.    Respiratory: Negative for shortness of breath.    Cardiovascular: Negative for chest pain, palpitations and leg swelling.   Gastrointestinal: Negative for abdominal pain and nausea.   Genitourinary: Negative for difficulty urinating.   Neurological: Positive for headache. Negative for facial asymmetry, weakness and numbness.   Psychiatric/Behavioral: The patient is nervous/anxious.          [facility]  The following portions of the patient's history were reviewed and updated as appropriate: allergies, current medications, past family history, past medical history, past social history, past surgical history and problem list.     Past Medical History:   Diagnosis Date   • Abnormal Pap smear of cervix    • Depression    • Hypothyroid    • Pancreatitis       No Known Allergies   Social History     Tobacco Use   • Smoking status: Former Smoker     Types: Electronic Cigarette     Quit date: 10/27/2019     Years since quittin.3   • Smokeless tobacco: Never Used   • Tobacco comment: started the gum   Vaping Use   • Vaping Use: Former   • Substances: Nicotine, Flavoring   • Devices: Disposable   Substance Use Topics   • Alcohol use: Not Currently     Alcohol/week: 12.0 standard drinks     Types: 12 Cans of beer per week      Comment: Stopped drinking 8/2021   • Drug use: Not Currently     Types: Oxycodone     Comment: Last use: 2015     No past surgical history on file.   Family History   Problem Relation Age of Onset   • Diabetes Mother    • Hyperlipidemia Mother    • Cancer Mother         Panceeatic cancer   • Diabetes Father    • Hyperlipidemia Father    • Hypertension Father    • Cancer Father         Lung/ brain cancer   • COPD Maternal Grandmother    • COPD Maternal Grandfather    • Colon cancer Maternal Grandfather    • Cancer Maternal Grandfather         Liver cancer   • COPD Paternal Grandmother    • Breast cancer Paternal Grandmother    • Cancer Paternal Grandmother         Breast cancer   • COPD Paternal Grandfather    • Cancer Maternal Aunt         Breast, still surviving   • Ovarian cancer Neg Hx    • Uterine cancer Neg Hx    • Osteoporosis Neg Hx            Current Outpatient Medications:   •  ibuprofen (ADVIL,MOTRIN) 200 MG tablet, Take 1 tablet by mouth Every 4 (Four) Hours., Disp: , Rfl:   •  Multiple Vitamins-Minerals (MULTIVITAMIN ADULT EXTRA C PO), Take 1 tablet by mouth., Disp: , Rfl:   •  Turmeric (QC TUMERIC COMPLEX PO), Take  by mouth., Disp: , Rfl:     VITALS:  /72   Pulse 52   Temp 97.8 °F (36.6 °C)   Wt 68 kg (150 lb)   SpO2 100%   BMI 22.81 kg/m²     Physical Exam  Vitals reviewed.   Constitutional:       General: She is not in acute distress.     Appearance: She is well-developed.   HENT:      Head: Normocephalic.      Mouth/Throat:      Mouth: Mucous membranes are moist.      Pharynx: Oropharynx is clear.   Eyes:      Pupils: Pupils are equal, round, and reactive to light.   Neck:      Thyroid: No thyromegaly.   Cardiovascular:      Rate and Rhythm: Normal rate and regular rhythm.      Heart sounds: Normal heart sounds.      Comments: No edema  Pulmonary:      Effort: Pulmonary effort is normal. No respiratory distress.      Breath sounds: Normal breath sounds.   Abdominal:      General: Bowel  sounds are normal.      Palpations: Abdomen is soft.      Tenderness: There is no abdominal tenderness.   Musculoskeletal:         General: Normal range of motion.      Cervical back: Normal range of motion and neck supple.      Comments: Normal ROM all major joints   Lymphadenopathy:      Cervical: No cervical adenopathy.   Skin:     General: Skin is warm and dry.      Capillary Refill: Capillary refill takes less than 2 seconds.      Findings: No rash.   Neurological:      General: No focal deficit present.      Mental Status: She is alert and oriented to person, place, and time.      Cranial Nerves: No cranial nerve deficit.      Sensory: No sensory deficit.      Motor: No weakness.      Gait: Gait normal.   Psychiatric:         Mood and Affect: Mood normal.         Behavior: Behavior normal.         Result Review :            Assessment and Plan    Diagnoses and all orders for this visit:    1. Anxiety (Primary)  -     hydrOXYzine pamoate (VISTARIL) 25 MG capsule; Take 1 capsule by mouth 3 (Three) Times a Day As Needed for Anxiety.  Dispense: 90 capsule; Refill: 1    2. Healthcare maintenance  -     CBC (No Diff); Future  -     Comprehensive Metabolic Panel; Future  -     Vitamin D 25 Hydroxy; Future    3. Nonintractable episodic headache, unspecified headache type  -     ubrogepant (ubrogepant) 100 MG tablet; Take 1 tab by mouth now and repeat in 2 hours if needed.  Dispense: 4 tablet; Refill: 0        I discussed the patients findings and my recommendations with patient.  Patient was encouraged to keep me informed of any acute changes, lack of improvement, or any new concerning symptoms.  Patient voiced understanding of all instructions and denied further questions.      Follow Up   Return in about 6 months (around 9/15/2022), or if symptoms worsen or fail to improve.      Electronically signed by:    ANGELINA Dorsey  03/15/2022

## 2022-03-19 NOTE — PROGRESS NOTES
Lab review: Vit d low. Need to take Vit D3 2000 units daily. Electrolytes liver kidney function good. No anemia.

## 2022-03-28 ENCOUNTER — PATIENT MESSAGE (OUTPATIENT)
Dept: INTERNAL MEDICINE | Facility: CLINIC | Age: 37
End: 2022-03-28

## 2022-03-28 NOTE — TELEPHONE ENCOUNTER
From: Estefany Jesus  To: Amparo Bansal, APRN  Sent: 3/28/2022 9:51 AM EDT  Subject: Medications    Hey Mrs. Bansal,    I just wanted to let you know Wilda had a lot of improvement with my headaches and vertigo. Unfortunately, I was unable to take the hydroxyzine for long. I stopped taking it, and switched to Zyrtek in the mornings, and used the migraine samples as recommended. Doing that, along with ear pressure physical techniques I feel a dramatic improvement in my vertigo, and now the headaches are gone too!    So I'm happy that is finally gone, however I am still having severe anxiety every day. I tried taking the Hydroxyzine only at night, but I was still waking up feeling really sedated and off, and it was making it very difficult to do things. As much as I need the relief, the Hydroxyzine is just impacting my day too much and I dont feel like I can function on it. Even if I take it only in the evenings, I wake up feeling very groggy and its not working well for me. I really feel like I need help dealing with my anxiety, as I am trying to exercise, focus on nutrition, and deal with my school and work and its very overwhelming. I still feel that heavy pressure in my chest every morning as I get going.    What would you recommend I do at this point? Should I see a psychologist? I feel that I need to do something as soon as possible or I worry I will drop the ball in my life.    Thanks for your time,  Estefany Jesus

## 2022-09-07 ENCOUNTER — TELEPHONE (OUTPATIENT)
Dept: INTERNAL MEDICINE | Facility: CLINIC | Age: 37
End: 2022-09-07

## 2022-09-07 NOTE — TELEPHONE ENCOUNTER
OC CALLED TODAY AND NEEDS A LETTER FROM HER PCP REGARDING SOME UPCOMING TRAVEL.  SHE'S TRAVELING WITH A GROUP AND DOES NOT WANT TO HAVE TO SHARE A HOTEL ROOM WITH THREE OTHER PEOPLE.  THEY CAN PUT HER IN A PRIVATE ROOM BUT THEY NEED A LETTER FROM HER PCP STATING SOMETHING TO THE EFFECT OF STAYING IN A ROOM WITH THREE OTHERS IS NOT IN HER BEST INTEREST DUE TO ANXIETY ISSUES.  PLEASE ADVISE PATIENT AS SOON AS POSSIBLE OR LET HER KNOW IF SHE NEEDS TO BE SEEN FOR THIS.  SHE HAS NOT EST CARE WITH A NEW PROVIDER YET.  [phone]

## 2022-09-07 NOTE — TELEPHONE ENCOUNTER
We can't do anything with this she would need to establish with a provider. Can you call her back and set up an appointment?